# Patient Record
Sex: FEMALE | Race: WHITE | NOT HISPANIC OR LATINO | Employment: OTHER | URBAN - METROPOLITAN AREA
[De-identification: names, ages, dates, MRNs, and addresses within clinical notes are randomized per-mention and may not be internally consistent; named-entity substitution may affect disease eponyms.]

---

## 2017-01-27 ENCOUNTER — TELEPHONE (OUTPATIENT)
Dept: ONCOLOGY | Facility: HOSPITAL | Age: 77
End: 2017-01-27

## 2017-01-27 DIAGNOSIS — C50.919 MALIGNANT NEOPLASM OF FEMALE BREAST, UNSPECIFIED LATERALITY, UNSPECIFIED SITE OF BREAST: Primary | ICD-10-CM

## 2017-01-27 NOTE — TELEPHONE ENCOUNTER
----- Message from Kiki Bolden sent at 1/27/2017  4:23 PM EST -----  Contact: son   Return call isabel      [phone]

## 2017-01-27 NOTE — TELEPHONE ENCOUNTER
----- Message from Kiki Bolden sent at 1/27/2017  3:54 PM EST -----  Contact: son  Return call [phone]    Attempted to call Miguelito. No answer, left message to call back.

## 2017-01-27 NOTE — TELEPHONE ENCOUNTER
----- Message from Kiki Bolden sent at 1/27/2017  1:28 PM EST -----  Contact: son   Pt son is calling about his mother arm that is swollen     miguelito  [phone]    Attempted to call Miguelito back. No answer, left message to call back

## 2017-01-27 NOTE — TELEPHONE ENCOUNTER
Received a call from pt's son stating that pt had a pace maker replaced today and they noticed some left arm swelling. Pt had US on left arm done at [facility] that was negative. They put dye in that arm and since pacemaker was put in, they put her in a sling on her left arm and she cannot move it. Wondering what needs to be done. Pt has a sleeve at home but unable to put it on because of sling. S/W Heather Concepcion NP. Per Heather, have pt be seen at lymphedema clinic. Informed pt's son that we would be referring her there. Informed him that if she has any trouble over the weekend, there is always a MD on call. He v/u. Order placed and message sent to scheduling.

## 2017-03-14 ENCOUNTER — TELEPHONE (OUTPATIENT)
Dept: ONCOLOGY | Facility: HOSPITAL | Age: 77
End: 2017-03-14

## 2017-03-14 RX ORDER — ANASTROZOLE 1 MG/1
1 TABLET ORAL DAILY
Qty: 90 TABLET | Refills: 1 | Status: SHIPPED | OUTPATIENT
Start: 2017-03-14 | End: 2017-05-05 | Stop reason: SDUPTHER

## 2017-03-14 NOTE — TELEPHONE ENCOUNTER
----- Message from Kiki Bolden sent at 3/14/2017  2:23 PM EDT -----   Per patient  bandar hernandez is needing a new  refill on her armidex 1mg#30 for 90m days            [phone]              escribed to pharmacy

## 2017-05-02 ENCOUNTER — HOSPITAL ENCOUNTER (OUTPATIENT)
Dept: BONE DENSITY | Facility: HOSPITAL | Age: 77
Discharge: HOME | End: 2017-05-02
Attending: INTERNAL MEDICINE | Admitting: INTERNAL MEDICINE

## 2017-05-02 DIAGNOSIS — M81.0 OSTEOPOROSIS: ICD-10-CM

## 2017-05-02 PROCEDURE — 77080 DXA BONE DENSITY AXIAL: CPT

## 2017-05-05 ENCOUNTER — OFFICE VISIT (OUTPATIENT)
Dept: ONCOLOGY | Facility: CLINIC | Age: 77
End: 2017-05-05

## 2017-05-05 ENCOUNTER — INFUSION (OUTPATIENT)
Dept: ONCOLOGY | Facility: HOSPITAL | Age: 77
End: 2017-05-05

## 2017-05-05 VITALS
RESPIRATION RATE: 16 BRPM | OXYGEN SATURATION: 98 % | SYSTOLIC BLOOD PRESSURE: 126 MMHG | BODY MASS INDEX: 28.6 KG/M2 | HEIGHT: 63 IN | WEIGHT: 161.4 LBS | HEART RATE: 69 BPM | TEMPERATURE: 98 F | DIASTOLIC BLOOD PRESSURE: 86 MMHG

## 2017-05-05 DIAGNOSIS — Z85.3 HISTORY OF BREAST CANCER: ICD-10-CM

## 2017-05-05 DIAGNOSIS — C50.512 MALIGNANT NEOPLASM OF LOWER-OUTER QUADRANT OF LEFT FEMALE BREAST (HCC): Primary | ICD-10-CM

## 2017-05-05 DIAGNOSIS — Z12.31 ENCOUNTER FOR SCREENING MAMMOGRAM FOR MALIGNANT NEOPLASM OF BREAST: ICD-10-CM

## 2017-05-05 DIAGNOSIS — M81.0 OSTEOPOROSIS: ICD-10-CM

## 2017-05-05 DIAGNOSIS — Z85.3 HISTORY OF BREAST CANCER: Primary | ICD-10-CM

## 2017-05-05 LAB
25(OH)D3 SERPL-MCNC: 49.7 NG/ML (ref 30–100)
ALBUMIN SERPL-MCNC: 3.5 G/DL (ref 3.5–5.2)
ALBUMIN/GLOB SERPL: 1.2 G/DL (ref 1.1–2.4)
ALP SERPL-CCNC: 73 U/L (ref 38–116)
ALT SERPL W P-5'-P-CCNC: 12 U/L (ref 0–33)
ANION GAP SERPL CALCULATED.3IONS-SCNC: 11.4 MMOL/L
AST SERPL-CCNC: 22 U/L (ref 0–32)
BASOPHILS # BLD AUTO: 0.03 10*3/MM3 (ref 0–0.1)
BASOPHILS NFR BLD AUTO: 0.7 % (ref 0–1.1)
BILIRUB SERPL-MCNC: 0.3 MG/DL (ref 0.1–1.2)
BUN BLD-MCNC: 37 MG/DL (ref 6–20)
BUN/CREAT SERPL: 33 (ref 7.3–30)
CALCIUM SPEC-SCNC: 9.2 MG/DL (ref 8.5–10.2)
CHLORIDE SERPL-SCNC: 102 MMOL/L (ref 98–107)
CO2 SERPL-SCNC: 27.6 MMOL/L (ref 22–29)
CREAT BLD-MCNC: 1.12 MG/DL (ref 0.6–1.1)
DEPRECATED RDW RBC AUTO: 52.2 FL (ref 37–49)
EOSINOPHIL # BLD AUTO: 0.21 10*3/MM3 (ref 0–0.36)
EOSINOPHIL NFR BLD AUTO: 4.6 % (ref 1–5)
ERYTHROCYTE [DISTWIDTH] IN BLOOD BY AUTOMATED COUNT: 13.9 % (ref 11.7–14.5)
GFR SERPL CREATININE-BSD FRML MDRD: 47 ML/MIN/1.73
GLOBULIN UR ELPH-MCNC: 2.9 GM/DL (ref 1.8–3.5)
GLUCOSE BLD-MCNC: 86 MG/DL (ref 74–124)
HCT VFR BLD AUTO: 38.9 % (ref 34–45)
HGB BLD-MCNC: 12.4 G/DL (ref 11.5–14.9)
IMM GRANULOCYTES # BLD: 0.01 10*3/MM3 (ref 0–0.03)
IMM GRANULOCYTES NFR BLD: 0.2 % (ref 0–0.5)
LYMPHOCYTES # BLD AUTO: 1.01 10*3/MM3 (ref 1–3.5)
LYMPHOCYTES NFR BLD AUTO: 22.1 % (ref 20–49)
MAGNESIUM SERPL-MCNC: 1.8 MG/DL (ref 1.8–2.5)
MCH RBC QN AUTO: 32.5 PG (ref 27–33)
MCHC RBC AUTO-ENTMCNC: 31.9 G/DL (ref 32–35)
MCV RBC AUTO: 101.8 FL (ref 83–97)
MONOCYTES # BLD AUTO: 0.45 10*3/MM3 (ref 0.25–0.8)
MONOCYTES NFR BLD AUTO: 9.8 % (ref 4–12)
NEUTROPHILS # BLD AUTO: 2.86 10*3/MM3 (ref 1.5–7)
NEUTROPHILS NFR BLD AUTO: 62.6 % (ref 39–75)
NRBC BLD MANUAL-RTO: 0 /100 WBC (ref 0–0)
PHOSPHATE SERPL-MCNC: 3.2 MG/DL (ref 2.5–4.5)
PLATELET # BLD AUTO: 130 10*3/MM3 (ref 150–375)
PMV BLD AUTO: 9.5 FL (ref 8.9–12.1)
POTASSIUM BLD-SCNC: 4.2 MMOL/L (ref 3.5–4.7)
PROT SERPL-MCNC: 6.4 G/DL (ref 6.3–8)
RBC # BLD AUTO: 3.82 10*6/MM3 (ref 3.9–5)
SODIUM BLD-SCNC: 141 MMOL/L (ref 134–145)
WBC NRBC COR # BLD: 4.57 10*3/MM3 (ref 4–10)

## 2017-05-05 PROCEDURE — 84100 ASSAY OF PHOSPHORUS: CPT | Performed by: INTERNAL MEDICINE

## 2017-05-05 PROCEDURE — 83735 ASSAY OF MAGNESIUM: CPT | Performed by: INTERNAL MEDICINE

## 2017-05-05 PROCEDURE — 99213 OFFICE O/P EST LOW 20 MIN: CPT | Performed by: INTERNAL MEDICINE

## 2017-05-05 PROCEDURE — 96365 THER/PROPH/DIAG IV INF INIT: CPT | Performed by: INTERNAL MEDICINE

## 2017-05-05 PROCEDURE — 85025 COMPLETE CBC W/AUTO DIFF WBC: CPT

## 2017-05-05 PROCEDURE — 25010000002 ZOLEDRONIC ACID 5 MG/100ML SOLUTION: Performed by: INTERNAL MEDICINE

## 2017-05-05 PROCEDURE — 80053 COMPREHEN METABOLIC PANEL: CPT

## 2017-05-05 PROCEDURE — 82306 VITAMIN D 25 HYDROXY: CPT | Performed by: INTERNAL MEDICINE

## 2017-05-05 RX ORDER — ZOLEDRONIC ACID 5 MG/100ML
5 INJECTION, SOLUTION INTRAVENOUS ONCE
Status: CANCELLED | OUTPATIENT
Start: 2017-05-05

## 2017-05-05 RX ORDER — SODIUM CHLORIDE 9 MG/ML
250 INJECTION, SOLUTION INTRAVENOUS ONCE
Status: CANCELLED | OUTPATIENT
Start: 2017-05-05

## 2017-05-05 RX ORDER — ZOLEDRONIC ACID 5 MG/100ML
5 INJECTION, SOLUTION INTRAVENOUS ONCE
Status: CANCELLED | OUTPATIENT
Start: 2018-05-05

## 2017-05-05 RX ORDER — ZOLEDRONIC ACID 5 MG/100ML
5 INJECTION, SOLUTION INTRAVENOUS ONCE
Status: COMPLETED | OUTPATIENT
Start: 2017-05-05 | End: 2017-05-05

## 2017-05-05 RX ORDER — RIVAROXABAN 20 MG/1
TABLET, FILM COATED ORAL
COMMUNITY
Start: 2017-04-14 | End: 2017-07-18

## 2017-05-05 RX ORDER — ANASTROZOLE 1 MG/1
1 TABLET ORAL DAILY
Qty: 90 TABLET | Refills: 3 | Status: SHIPPED | OUTPATIENT
Start: 2017-05-05 | End: 2018-06-22 | Stop reason: SDUPTHER

## 2017-05-05 RX ORDER — TIOTROPIUM BROMIDE 18 UG/1
CAPSULE ORAL; RESPIRATORY (INHALATION)
COMMUNITY
Start: 2017-04-10

## 2017-05-05 RX ORDER — SODIUM CHLORIDE 9 MG/ML
250 INJECTION, SOLUTION INTRAVENOUS ONCE
Status: COMPLETED | OUTPATIENT
Start: 2017-05-05 | End: 2017-05-05

## 2017-05-05 RX ORDER — SODIUM CHLORIDE 9 MG/ML
250 INJECTION, SOLUTION INTRAVENOUS ONCE
Status: CANCELLED | OUTPATIENT
Start: 2018-05-05

## 2017-05-05 RX ADMIN — SODIUM CHLORIDE 250 ML: 900 INJECTION, SOLUTION INTRAVENOUS at 15:54

## 2017-05-05 RX ADMIN — ZOLEDRONIC ACID 5 MG: 0.05 INJECTION, SOLUTION INTRAVENOUS at 15:53

## 2017-07-18 ENCOUNTER — OFFICE VISIT (OUTPATIENT)
Dept: ENDOCRINOLOGY | Age: 77
End: 2017-07-18

## 2017-07-18 VITALS
SYSTOLIC BLOOD PRESSURE: 128 MMHG | WEIGHT: 158 LBS | BODY MASS INDEX: 29.08 KG/M2 | HEIGHT: 62 IN | OXYGEN SATURATION: 97 % | DIASTOLIC BLOOD PRESSURE: 88 MMHG | HEART RATE: 62 BPM

## 2017-07-18 DIAGNOSIS — R13.19 OTHER DYSPHAGIA: ICD-10-CM

## 2017-07-18 DIAGNOSIS — E04.2 GOITER, NONTOXIC, MULTINODULAR: ICD-10-CM

## 2017-07-18 DIAGNOSIS — E04.1 THYROID NODULE: Primary | ICD-10-CM

## 2017-07-18 DIAGNOSIS — E55.9 VITAMIN D DEFICIENCY: ICD-10-CM

## 2017-07-18 DIAGNOSIS — M81.0 OSTEOPOROSIS: ICD-10-CM

## 2017-07-18 PROCEDURE — 99214 OFFICE O/P EST MOD 30 MIN: CPT | Performed by: INTERNAL MEDICINE

## 2017-07-18 RX ORDER — PANTOPRAZOLE SODIUM 40 MG/1
TABLET, DELAYED RELEASE ORAL
COMMUNITY
Start: 2017-06-20 | End: 2017-11-13

## 2017-08-24 ENCOUNTER — HOSPITAL ENCOUNTER (OUTPATIENT)
Dept: MAMMOGRAPHY | Facility: HOSPITAL | Age: 77
Discharge: HOME | End: 2017-08-24
Attending: INTERNAL MEDICINE | Admitting: INTERNAL MEDICINE

## 2017-08-24 DIAGNOSIS — C50.512 MALIGNANT NEOPLASM OF LOWER-OUTER QUADRANT OF LEFT FEMALE BREAST (HCC): ICD-10-CM

## 2017-08-24 DIAGNOSIS — Z12.31 ENCOUNTER FOR SCREENING MAMMOGRAM FOR MALIGNANT NEOPLASM OF BREAST: ICD-10-CM

## 2017-08-24 PROCEDURE — G0202 SCR MAMMO BI INCL CAD: HCPCS

## 2017-08-30 ENCOUNTER — TELEPHONE (OUTPATIENT)
Dept: ONCOLOGY | Facility: CLINIC | Age: 77
End: 2017-08-30

## 2017-08-30 DIAGNOSIS — R92.8 ABNORMAL MAMMOGRAM OF RIGHT BREAST: Primary | ICD-10-CM

## 2017-08-30 NOTE — TELEPHONE ENCOUNTER
Received results of her right mammogram showing an abnormality, with diagnostic imaging recommended.    I called and notified her of this and have ordered a diagnostic mammogram with ultrasound of the right breast.

## 2017-08-31 ENCOUNTER — TELEPHONE (OUTPATIENT)
Dept: GENERAL RADIOLOGY | Facility: HOSPITAL | Age: 77
End: 2017-08-31

## 2017-09-01 ENCOUNTER — APPOINTMENT (OUTPATIENT)
Dept: ULTRASOUND IMAGING | Facility: HOSPITAL | Age: 77
End: 2017-09-01
Attending: INTERNAL MEDICINE

## 2017-09-01 ENCOUNTER — APPOINTMENT (OUTPATIENT)
Dept: MAMMOGRAPHY | Facility: HOSPITAL | Age: 77
End: 2017-09-01
Attending: INTERNAL MEDICINE

## 2017-09-07 ENCOUNTER — HOSPITAL ENCOUNTER (OUTPATIENT)
Dept: ULTRASOUND IMAGING | Facility: HOSPITAL | Age: 77
Discharge: HOME | End: 2017-09-07
Attending: INTERNAL MEDICINE

## 2017-09-07 ENCOUNTER — HOSPITAL ENCOUNTER (OUTPATIENT)
Dept: MAMMOGRAPHY | Facility: HOSPITAL | Age: 77
Discharge: HOME | End: 2017-09-07
Attending: INTERNAL MEDICINE | Admitting: INTERNAL MEDICINE

## 2017-09-07 DIAGNOSIS — R92.8 ABNORMAL MAMMOGRAM OF RIGHT BREAST: ICD-10-CM

## 2017-09-07 PROCEDURE — 76642 ULTRASOUND BREAST LIMITED: CPT

## 2017-09-07 PROCEDURE — G0206 DX MAMMO INCL CAD UNI: HCPCS

## 2017-09-13 ENCOUNTER — TELEPHONE (OUTPATIENT)
Dept: ONCOLOGY | Facility: CLINIC | Age: 77
End: 2017-09-13

## 2017-09-13 NOTE — TELEPHONE ENCOUNTER
----- Message from Dipesh Monterroso MD sent at 9/13/2017  7:35 AM EDT -----  Regarding: please call w mammo and u/s results  Please let her know that her mammogram and u/s show probable scar tissue and other benign findings and the radiologist wants them repeated in 6 months.  Thanks,  AURELIO

## 2017-09-13 NOTE — TELEPHONE ENCOUNTER
Informed pt. Per dr. Monterroso that her mammogram and u/s showed some scar tissue and some other benign findings.  He recommends to repeat them again in 6 months. V/u.

## 2017-11-13 ENCOUNTER — LAB (OUTPATIENT)
Dept: LAB | Facility: HOSPITAL | Age: 77
End: 2017-11-13
Attending: INTERNAL MEDICINE

## 2017-11-13 ENCOUNTER — OFFICE VISIT (OUTPATIENT)
Dept: ONCOLOGY | Facility: CLINIC | Age: 77
End: 2017-11-13
Attending: INTERNAL MEDICINE

## 2017-11-13 VITALS
WEIGHT: 155.4 LBS | BODY MASS INDEX: 27.54 KG/M2 | HEIGHT: 63 IN | RESPIRATION RATE: 16 BRPM | TEMPERATURE: 97.9 F | SYSTOLIC BLOOD PRESSURE: 122 MMHG | HEART RATE: 73 BPM | DIASTOLIC BLOOD PRESSURE: 76 MMHG | OXYGEN SATURATION: 96 %

## 2017-11-13 DIAGNOSIS — Z17.0 MALIGNANT NEOPLASM OF LOWER-OUTER QUADRANT OF LEFT BREAST OF FEMALE, ESTROGEN RECEPTOR POSITIVE (HCC): Primary | ICD-10-CM

## 2017-11-13 DIAGNOSIS — C50.512 MALIGNANT NEOPLASM OF LOWER-OUTER QUADRANT OF LEFT FEMALE BREAST (HCC): ICD-10-CM

## 2017-11-13 DIAGNOSIS — R92.8 ABNORMALITY OF RIGHT BREAST ON SCREENING MAMMOGRAM: ICD-10-CM

## 2017-11-13 DIAGNOSIS — C50.512 MALIGNANT NEOPLASM OF LOWER-OUTER QUADRANT OF LEFT BREAST OF FEMALE, ESTROGEN RECEPTOR POSITIVE (HCC): Primary | ICD-10-CM

## 2017-11-13 LAB
BASOPHILS # BLD AUTO: 0.05 10*3/MM3 (ref 0–0.1)
BASOPHILS NFR BLD AUTO: 1 % (ref 0–1.1)
DEPRECATED RDW RBC AUTO: 50.7 FL (ref 37–49)
EOSINOPHIL # BLD AUTO: 0.27 10*3/MM3 (ref 0–0.36)
EOSINOPHIL NFR BLD AUTO: 5.2 % (ref 1–5)
ERYTHROCYTE [DISTWIDTH] IN BLOOD BY AUTOMATED COUNT: 14.5 % (ref 11.7–14.5)
HCT VFR BLD AUTO: 42.9 % (ref 34–45)
HGB BLD-MCNC: 14.5 G/DL (ref 11.5–14.9)
IMM GRANULOCYTES # BLD: 0.03 10*3/MM3 (ref 0–0.03)
IMM GRANULOCYTES NFR BLD: 0.6 % (ref 0–0.5)
LYMPHOCYTES # BLD AUTO: 1.26 10*3/MM3 (ref 1–3.5)
LYMPHOCYTES NFR BLD AUTO: 24.1 % (ref 20–49)
MCH RBC QN AUTO: 32.4 PG (ref 27–33)
MCHC RBC AUTO-ENTMCNC: 33.8 G/DL (ref 32–35)
MCV RBC AUTO: 96 FL (ref 83–97)
MONOCYTES # BLD AUTO: 0.67 10*3/MM3 (ref 0.25–0.8)
MONOCYTES NFR BLD AUTO: 12.8 % (ref 4–12)
NEUTROPHILS # BLD AUTO: 2.94 10*3/MM3 (ref 1.5–7)
NEUTROPHILS NFR BLD AUTO: 56.3 % (ref 39–75)
NRBC BLD MANUAL-RTO: 0 /100 WBC (ref 0–0)
PLATELET # BLD AUTO: 127 10*3/MM3 (ref 150–375)
PMV BLD AUTO: 10.5 FL (ref 8.9–12.1)
RBC # BLD AUTO: 4.47 10*6/MM3 (ref 3.9–5)
WBC NRBC COR # BLD: 5.22 10*3/MM3 (ref 4–10)

## 2017-11-13 PROCEDURE — 36416 COLLJ CAPILLARY BLOOD SPEC: CPT | Performed by: INTERNAL MEDICINE

## 2017-11-13 PROCEDURE — 99213 OFFICE O/P EST LOW 20 MIN: CPT | Performed by: INTERNAL MEDICINE

## 2017-11-13 PROCEDURE — 85025 COMPLETE CBC W/AUTO DIFF WBC: CPT | Performed by: INTERNAL MEDICINE

## 2017-11-13 RX ORDER — VENLAFAXINE HYDROCHLORIDE 75 MG/1
CAPSULE, EXTENDED RELEASE ORAL
COMMUNITY
Start: 2017-10-02

## 2017-11-13 RX ORDER — SIMVASTATIN 20 MG
TABLET ORAL
COMMUNITY
Start: 2017-10-11 | End: 2018-05-14

## 2017-11-13 NOTE — PROGRESS NOTES
[facility] GROUP OUTPATIENT CLINIC FOLLOW UP VISIT    REASON FOR FOLLOW-UP:    1.  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  She had a left mastectomy and subsequently completed adjuvant chemotherapy with Adriamycin and Cytoxan followed by Taxol, with chemotherapy initiated on 10/5/2012.  Chemotherapy was complete as of 3/14/2013.  2.  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  3.  Anastrozole 1 mg daily was initiated in April 2013.  4.  Osteoporosis: Reclast was initiated in April 2013 with plans for annual therapy.  Bone density on 5/2/2017 shows osteoporosis with stable findings.    HISTORY OF PRESENT ILLNESS:  Michelle Peña is a 77 y.o. female who returns today for follow up of the above issue.      She continues to tolerate the therapy reasonably well.  She continues to have severe osteoarthritis pain mostly in her knees.  In May of this year after I saw her apparently she was hospitalized [place] with apparent gastrointestinal bleeding.  She states that she had upper and lower endoscopy with no obvious etiology of bleeding discovered.  We have no records.  She states that she was on meloxicam at the time and this was discontinued.  She was also on Xarelto at that time due to atrial fibrillation.  She has been reluctant to resume this due to the risk of recurrent bleeding.    In addition, she did have an abnormal screening mammogram of the right breast.  This was followed up with diagnostic imaging performed in early September which was read as BI-RADS Category 3 with follow-up in 6 months recommended.    PAST MEDICAL, SURGICAL, FAMILY, AND SOCIAL HISTORIES WERE REVIEWED WITH THE PATIENT AND IN THE ELECTRONIC MEDICAL RECORD, AND WERE UPDATED IF INDICATED.    ALLERGIES:  Allergies   Allergen Reactions   • Augmentin [Amoxicillin-Pot Clavulanate]    • Penicillins        MEDICATIONS:  The medication  "list has been reviewed with the patient by the medical assistant, and the list has been updated in the electronic medical record, which I reviewed.  Medication dosages and frequencies were confirmed to be accurate.    REVIEW OF SYSTEMS:  PAIN:  See Vital Signs below.  GENERAL:  No fevers, chills, night sweats, or unintended weight loss.  Mild fatigue.  SKIN:  No rashes or non-healing lesions.  HEME/LYMPH:  No abnormal bleeding.  No palpable lymphadenopathy.  EYES:  No vision changes or diplopia.  ENT:  No sore throat or difficulty swallowing.  RESPIRATORY:  No cough, shortness of breath, hemoptysis, or wheezing.  CARDIOVASCULAR:  No chest pain, palpitations, orthopnea, or dyspnea on exertion.  GASTROINTESTINAL:  No abdominal pain, nausea, vomiting, constipation, diarrhea, melena, or hematochezia.  GENITOURINARY:  No dysuria or hematuria.  MUSCULOSKELETAL:  Persistent osteoarthritis pain in her knees.  NEUROLOGIC:  No dizziness, loss of consciousness, or seizures.  PSYCHIATRIC:  No depression, anxiety, or mood changes.    Vitals:    11/13/17 1110   BP: 122/76   Pulse: 73   Resp: 16   Temp: 97.9 °F (36.6 °C)   TempSrc: Oral   SpO2: 96%   Weight: 155 lb 6.4 oz (70.5 kg)   Height: 62.52\" (158.8 cm)  Comment: new ht w/shoes   PainSc: 9  Comment: knees       PHYSICAL EXAMINATION:  GENERAL:  Well-developed well-nourished female; awake, alert and oriented, in no acute distress.  SKIN:  Warm and dry, without rashes, purpura, or petechiae.  HEAD:  Normocephalic, atraumatic.  EYES:  Pupils equal, round and reactive to light.  Extraocular movements intact.  Conjunctivae normal.  EARS:  Hearing intact.  NOSE:  Septum midline.  No excoriations or nasal discharge.  MOUTH:  No stomatitis or ulcers.  Lips are normal.  THROAT:  Oropharynx without lesions or exudates.  NECK:  Supple with good range of motion; no thyromegaly or masses; no JVD or bruits.  LYMPHATICS:  No cervical, supraclavicular, axillary, or inguinal " lymphadenopathy.  CHEST:  Lungs are clear to auscultation bilaterally.  No wheezes, rales, or rhonchi.  An ICD is present in the left subclavian area.  BREASTS:  The right breast was examined today.  No nodules or nipple discharge.  No right axillary adenopathy.  The left chest wall is examined today.  No skin changes or subcutaneous nodules.  HEART:  Regular rate; normal rhythm.  No murmurs, gallops or rubs.  ABDOMEN:  Soft, non-tender, non-distended.  Normal active bowel sounds.  No organomegaly.  EXTREMITIES:  No clubbing, cyanosis, or edema.  Osteoarthritic changes present in her knees.  NEUROLOGICAL:  No focal neurologic deficits.    DIAGNOSTIC DATA:  Lab Results   Component Value Date    WBC 5.22 11/13/2017    HGB 14.5 11/13/2017    HCT 42.9 11/13/2017    MCV 96.0 11/13/2017     (L) 11/13/2017       IMAGING:      Screening mammogram 8/24/2017 reds category 0.  Follow-up diagnostic imaging 9/7/2017 BI-RADS Category 3 follow-up in 6 months recommended.    DEXA scan on 5/2/2017 shows osteoporosis with stable findings.      ASSESSMENT:  This is a 77 y.o. female with:  1.  History of pathologic stage IIIc invasive mammary carcinoma of the lower outer left breast.  She had a left mastectomy and subsequently completed adjuvant chemotherapy with Adriamycin and Cytoxan followed by Taxol, with chemotherapy initiated on 10/5/2012.  Chemotherapy was complete as of 3/14/2013.  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  Anastrozole 1 mg daily was initiated in April 2013 with plans for 5 years of therapy to be complete as of April 2018.  At this point considering the extent of disease at diagnosis, we are considering continuation of anastrozole beyond 5 years.  2.  Osteoporosis: Reclast was initiated in April 2013 with plans for annual therapy.   DEXA scan on 5/2/2017 with no significant change from prior.  She will be due for Reclast in early  May.    PLAN:   1.  Continue anastrozole 1 mg daily.  We may elect to continue this beyond 5 years.  2.  Reclast annually.  This will be administered in 6 months at her next follow-up visit.  3.  Return in 6 months for follow-up with a CBC and breast exam and Reclast that day with repeat diagnostic imaging of the right breast done about a week prior to that visit.

## 2018-05-07 ENCOUNTER — HOSPITAL ENCOUNTER (OUTPATIENT)
Dept: ULTRASOUND IMAGING | Facility: HOSPITAL | Age: 78
End: 2018-05-07
Attending: INTERNAL MEDICINE

## 2018-05-07 ENCOUNTER — HOSPITAL ENCOUNTER (OUTPATIENT)
Dept: MAMMOGRAPHY | Facility: HOSPITAL | Age: 78
Discharge: HOME | End: 2018-05-07
Attending: INTERNAL MEDICINE | Admitting: INTERNAL MEDICINE

## 2018-05-07 DIAGNOSIS — R92.8 ABNORMALITY OF RIGHT BREAST ON SCREENING MAMMOGRAM: ICD-10-CM

## 2018-05-07 PROCEDURE — 77065 DX MAMMO INCL CAD UNI: CPT

## 2018-05-08 DIAGNOSIS — Z17.0 MALIGNANT NEOPLASM OF LOWER-OUTER QUADRANT OF LEFT BREAST OF FEMALE, ESTROGEN RECEPTOR POSITIVE (HCC): Primary | ICD-10-CM

## 2018-05-08 DIAGNOSIS — C50.512 MALIGNANT NEOPLASM OF LOWER-OUTER QUADRANT OF LEFT BREAST OF FEMALE, ESTROGEN RECEPTOR POSITIVE (HCC): Primary | ICD-10-CM

## 2018-05-10 ENCOUNTER — TELEPHONE (OUTPATIENT)
Dept: ONCOLOGY | Facility: CLINIC | Age: 78
End: 2018-05-10

## 2018-05-10 DIAGNOSIS — Z17.0 MALIGNANT NEOPLASM OF LOWER-OUTER QUADRANT OF LEFT BREAST OF FEMALE, ESTROGEN RECEPTOR POSITIVE (HCC): Primary | ICD-10-CM

## 2018-05-10 DIAGNOSIS — M81.0 OSTEOPOROSIS WITHOUT CURRENT PATHOLOGICAL FRACTURE, UNSPECIFIED OSTEOPOROSIS TYPE: ICD-10-CM

## 2018-05-10 DIAGNOSIS — C50.512 MALIGNANT NEOPLASM OF LOWER-OUTER QUADRANT OF LEFT BREAST OF FEMALE, ESTROGEN RECEPTOR POSITIVE (HCC): Primary | ICD-10-CM

## 2018-05-10 RX ORDER — SODIUM CHLORIDE 9 MG/ML
250 INJECTION, SOLUTION INTRAVENOUS ONCE
Status: CANCELLED | OUTPATIENT
Start: 2018-05-14

## 2018-05-10 RX ORDER — ZOLEDRONIC ACID 5 MG/100ML
5 INJECTION, SOLUTION INTRAVENOUS ONCE
Status: CANCELLED | OUTPATIENT
Start: 2018-05-14

## 2018-05-14 ENCOUNTER — OFFICE VISIT (OUTPATIENT)
Dept: ONCOLOGY | Facility: CLINIC | Age: 78
End: 2018-05-14

## 2018-05-14 ENCOUNTER — INFUSION (OUTPATIENT)
Dept: ONCOLOGY | Facility: HOSPITAL | Age: 78
End: 2018-05-14

## 2018-05-14 VITALS
DIASTOLIC BLOOD PRESSURE: 80 MMHG | HEART RATE: 66 BPM | RESPIRATION RATE: 14 BRPM | SYSTOLIC BLOOD PRESSURE: 120 MMHG | WEIGHT: 157 LBS | HEIGHT: 62 IN | OXYGEN SATURATION: 97 % | TEMPERATURE: 98.2 F | BODY MASS INDEX: 28.89 KG/M2

## 2018-05-14 DIAGNOSIS — C50.512 MALIGNANT NEOPLASM OF LOWER-OUTER QUADRANT OF LEFT BREAST OF FEMALE, ESTROGEN RECEPTOR POSITIVE (HCC): ICD-10-CM

## 2018-05-14 DIAGNOSIS — Z17.0 MALIGNANT NEOPLASM OF LOWER-OUTER QUADRANT OF LEFT BREAST OF FEMALE, ESTROGEN RECEPTOR POSITIVE (HCC): ICD-10-CM

## 2018-05-14 DIAGNOSIS — C50.512 MALIGNANT NEOPLASM OF LOWER-OUTER QUADRANT OF LEFT BREAST OF FEMALE, ESTROGEN RECEPTOR POSITIVE (HCC): Primary | ICD-10-CM

## 2018-05-14 DIAGNOSIS — M81.0 OSTEOPOROSIS WITHOUT CURRENT PATHOLOGICAL FRACTURE, UNSPECIFIED OSTEOPOROSIS TYPE: Primary | ICD-10-CM

## 2018-05-14 DIAGNOSIS — Z85.3 HISTORY OF BREAST CANCER: ICD-10-CM

## 2018-05-14 DIAGNOSIS — M81.0 OSTEOPOROSIS WITHOUT CURRENT PATHOLOGICAL FRACTURE, UNSPECIFIED OSTEOPOROSIS TYPE: ICD-10-CM

## 2018-05-14 DIAGNOSIS — Z17.0 MALIGNANT NEOPLASM OF LOWER-OUTER QUADRANT OF LEFT BREAST OF FEMALE, ESTROGEN RECEPTOR POSITIVE (HCC): Primary | ICD-10-CM

## 2018-05-14 LAB
ALBUMIN SERPL-MCNC: 4.1 G/DL (ref 3.5–5.2)
ALBUMIN/GLOB SERPL: 1.5 G/DL (ref 1.1–2.4)
ALP SERPL-CCNC: 84 U/L (ref 38–116)
ALT SERPL W P-5'-P-CCNC: 13 U/L (ref 0–33)
ANION GAP SERPL CALCULATED.3IONS-SCNC: 11.5 MMOL/L
AST SERPL-CCNC: 24 U/L (ref 0–32)
BASOPHILS # BLD AUTO: 0.04 10*3/MM3 (ref 0–0.1)
BASOPHILS NFR BLD AUTO: 0.7 % (ref 0–1.1)
BILIRUB SERPL-MCNC: 0.7 MG/DL (ref 0.1–1.2)
BUN BLD-MCNC: 32 MG/DL (ref 6–20)
BUN/CREAT SERPL: 24.4 (ref 7.3–30)
CALCIUM SPEC-SCNC: 10.2 MG/DL (ref 8.5–10.2)
CHLORIDE SERPL-SCNC: 99 MMOL/L (ref 98–107)
CO2 SERPL-SCNC: 28.5 MMOL/L (ref 22–29)
CREAT BLD-MCNC: 1.31 MG/DL (ref 0.6–1.1)
DEPRECATED RDW RBC AUTO: 47.8 FL (ref 37–49)
EOSINOPHIL # BLD AUTO: 0.11 10*3/MM3 (ref 0–0.36)
EOSINOPHIL NFR BLD AUTO: 2 % (ref 1–5)
ERYTHROCYTE [DISTWIDTH] IN BLOOD BY AUTOMATED COUNT: 13.1 % (ref 11.7–14.5)
GFR SERPL CREATININE-BSD FRML MDRD: 39 ML/MIN/1.73
GLOBULIN UR ELPH-MCNC: 2.7 GM/DL (ref 1.8–3.5)
GLUCOSE BLD-MCNC: 148 MG/DL (ref 74–124)
HCT VFR BLD AUTO: 45.6 % (ref 34–45)
HGB BLD-MCNC: 14.9 G/DL (ref 11.5–14.9)
IMM GRANULOCYTES # BLD: 0.01 10*3/MM3 (ref 0–0.03)
IMM GRANULOCYTES NFR BLD: 0.2 % (ref 0–0.5)
LYMPHOCYTES # BLD AUTO: 1.02 10*3/MM3 (ref 1–3.5)
LYMPHOCYTES NFR BLD AUTO: 19 % (ref 20–49)
MAGNESIUM SERPL-MCNC: 1.7 MG/DL (ref 1.8–2.5)
MCH RBC QN AUTO: 32.3 PG (ref 27–33)
MCHC RBC AUTO-ENTMCNC: 32.7 G/DL (ref 32–35)
MCV RBC AUTO: 98.7 FL (ref 83–97)
MONOCYTES # BLD AUTO: 0.43 10*3/MM3 (ref 0.25–0.8)
MONOCYTES NFR BLD AUTO: 8 % (ref 4–12)
NEUTROPHILS # BLD AUTO: 3.77 10*3/MM3 (ref 1.5–7)
NEUTROPHILS NFR BLD AUTO: 70.1 % (ref 39–75)
NRBC BLD MANUAL-RTO: 0 /100 WBC (ref 0–0)
PHOSPHATE SERPL-MCNC: 4.5 MG/DL (ref 2.5–4.5)
PLATELET # BLD AUTO: 137 10*3/MM3 (ref 150–375)
PMV BLD AUTO: 10.4 FL (ref 8.9–12.1)
POTASSIUM BLD-SCNC: 4.4 MMOL/L (ref 3.5–4.7)
PROT SERPL-MCNC: 6.8 G/DL (ref 6.3–8)
RBC # BLD AUTO: 4.62 10*6/MM3 (ref 3.9–5)
SODIUM BLD-SCNC: 139 MMOL/L (ref 134–145)
WBC NRBC COR # BLD: 5.38 10*3/MM3 (ref 4–10)

## 2018-05-14 PROCEDURE — 80053 COMPREHEN METABOLIC PANEL: CPT | Performed by: INTERNAL MEDICINE

## 2018-05-14 PROCEDURE — 96365 THER/PROPH/DIAG IV INF INIT: CPT | Performed by: INTERNAL MEDICINE

## 2018-05-14 PROCEDURE — 36415 COLL VENOUS BLD VENIPUNCTURE: CPT | Performed by: INTERNAL MEDICINE

## 2018-05-14 PROCEDURE — 25010000002 ZOLEDRONIC ACID 5 MG/100ML SOLUTION: Performed by: INTERNAL MEDICINE

## 2018-05-14 PROCEDURE — 84100 ASSAY OF PHOSPHORUS: CPT | Performed by: INTERNAL MEDICINE

## 2018-05-14 PROCEDURE — 83735 ASSAY OF MAGNESIUM: CPT | Performed by: INTERNAL MEDICINE

## 2018-05-14 PROCEDURE — 85025 COMPLETE CBC W/AUTO DIFF WBC: CPT | Performed by: INTERNAL MEDICINE

## 2018-05-14 PROCEDURE — 99214 OFFICE O/P EST MOD 30 MIN: CPT | Performed by: INTERNAL MEDICINE

## 2018-05-14 RX ORDER — SODIUM CHLORIDE 9 MG/ML
250 INJECTION, SOLUTION INTRAVENOUS ONCE
Status: CANCELLED | OUTPATIENT
Start: 2018-05-14

## 2018-05-14 RX ORDER — SODIUM CHLORIDE 9 MG/ML
250 INJECTION, SOLUTION INTRAVENOUS ONCE
Status: COMPLETED | OUTPATIENT
Start: 2018-05-14 | End: 2018-05-14

## 2018-05-14 RX ORDER — ZOLEDRONIC ACID 5 MG/100ML
5 INJECTION, SOLUTION INTRAVENOUS ONCE
Status: COMPLETED | OUTPATIENT
Start: 2018-05-14 | End: 2018-05-14

## 2018-05-14 RX ORDER — APIXABAN 5 MG/1
TABLET, FILM COATED ORAL
COMMUNITY
Start: 2018-04-25

## 2018-05-14 RX ORDER — ATORVASTATIN CALCIUM 20 MG/1
TABLET, FILM COATED ORAL
COMMUNITY
Start: 2018-02-21

## 2018-05-14 RX ORDER — ZOLEDRONIC ACID 5 MG/100ML
5 INJECTION, SOLUTION INTRAVENOUS ONCE
Status: CANCELLED | OUTPATIENT
Start: 2018-05-14

## 2018-05-14 RX ADMIN — SODIUM CHLORIDE 250 ML: 9 INJECTION, SOLUTION INTRAVENOUS at 14:57

## 2018-05-14 RX ADMIN — ZOLEDRONIC ACID 5 MG: 0.05 INJECTION, SOLUTION INTRAVENOUS at 15:12

## 2018-05-14 NOTE — PROGRESS NOTES
[facility] GROUP OUTPATIENT CLINIC FOLLOW UP VISIT    REASON FOR FOLLOW-UP:    1.  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  She had a left mastectomy and subsequently completed adjuvant chemotherapy with Adriamycin and Cytoxan followed by Taxol, with chemotherapy initiated on 10/5/2012.  Chemotherapy was complete as of 3/14/2013.  2.  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  3.  Anastrozole 1 mg daily was initiated in April 2013.  4.  Osteoporosis: Reclast was initiated in April 2013 with plans for annual therapy.  Bone density on 5/2/2017 shows osteoporosis with stable findings.    HISTORY OF PRESENT ILLNESS:  Michelle Peña is a 77 y.o. female who returns today for follow up of the above issue.      She unfortunately lost her  several months ago.  A few weeks after that she had a stroke.  She got TPA and has no residual neurologic effects from this.  Osteoarthritis pain in her knees is more severe.  She is wearing braces on both knees which do help.  She otherwise tolerates the anastrozole well without significant adverse effects.  She denies any new problems with her chest wall.  She recently had a repeat diagnostic mammogram that was read as BI-RADS Category 2 with routine annual follow-up recommended.    PAST MEDICAL, SURGICAL, FAMILY, AND SOCIAL HISTORIES WERE REVIEWED WITH THE PATIENT AND IN THE ELECTRONIC MEDICAL RECORD, AND WERE UPDATED IF INDICATED.    ALLERGIES:  Allergies   Allergen Reactions   • Augmentin [Amoxicillin-Pot Clavulanate]    • Penicillins        MEDICATIONS:  The medication list has been reviewed with the patient by the medical assistant, and the list has been updated in the electronic medical record, which I reviewed.  Medication dosages and frequencies were confirmed to be accurate.    REVIEW OF SYSTEMS:  PAIN:  See Vital Signs below.  GENERAL:  No fevers,  "chills, night sweats, or unintended weight loss.  Mild fatigue.  SKIN:  No rashes or non-healing lesions.  HEME/LYMPH:  No abnormal bleeding.  No palpable lymphadenopathy.  EYES:  No vision changes or diplopia.  ENT:  No sore throat or difficulty swallowing.  RESPIRATORY:  No cough, shortness of breath, hemoptysis, or wheezing.  CARDIOVASCULAR:  No chest pain, palpitations, orthopnea, or dyspnea on exertion.  GASTROINTESTINAL:  No abdominal pain, nausea, vomiting, constipation, diarrhea, melena, or hematochezia.  GENITOURINARY:  No dysuria or hematuria.  MUSCULOSKELETAL:  Persistent osteoarthritis pain in her knees which has been more severe recently.  NEUROLOGIC:  No dizziness, loss of consciousness, or seizures.  No neurologic effects following her stroke.  PSYCHIATRIC:  No depression, anxiety, or mood changes.    Vitals:    05/14/18 1354   BP: 120/80   Pulse: 66   Resp: 14   Temp: 98.2 °F (36.8 °C)   SpO2: 97%  Comment: at rest   Weight: 71.2 kg (157 lb)  Comment: wearing knee braces   Height: 157.5 cm (62\")  Comment: new ht w/shoes   PainSc:   9   PainLoc: Knee       PHYSICAL EXAMINATION:  GENERAL:  Well-developed well-nourished female; awake, alert and oriented, in no acute distress.  SKIN:  Warm and dry, without rashes, purpura, or petechiae.  HEAD:  Normocephalic, atraumatic.  EYES:  Pupils equal, round and reactive to light.  Extraocular movements intact.  Conjunctivae normal.  EARS:  Hearing intact.  NOSE:  Septum midline.  No excoriations or nasal discharge.  MOUTH:  No stomatitis or ulcers.  Lips are normal.  THROAT:  Oropharynx without lesions or exudates.  NECK:  Supple with good range of motion; no thyromegaly or masses; no JVD or bruits.  LYMPHATICS:  No cervical, supraclavicular, axillary, or inguinal lymphadenopathy.  CHEST:  Lungs are clear to auscultation bilaterally.  No wheezes, rales, or rhonchi.  An ICD is present in the left subclavian area.  BREASTS:  Not examined today  HEART:  Regular " rate; normal rhythm.  No murmurs, gallops or rubs.  ABDOMEN:  Soft, non-tender, non-distended.  Normal active bowel sounds.  No organomegaly.  EXTREMITIES:  No clubbing, cyanosis, or edema.  Osteoarthritic changes present in her knees.  NEUROLOGICAL:  No focal neurologic deficits.    DIAGNOSTIC DATA:  Results for orders placed or performed in visit on 05/14/18   Comprehensive Metabolic Panel   Result Value Ref Range    Glucose 148 (H) 74 - 124 mg/dL    BUN 32 (H) 6 - 20 mg/dL    Creatinine 1.31 (H) 0.60 - 1.10 mg/dL    Sodium 139 134 - 145 mmol/L    Potassium 4.4 3.5 - 4.7 mmol/L    Chloride 99 98 - 107 mmol/L    CO2 28.5 22.0 - 29.0 mmol/L    Calcium 10.2 8.5 - 10.2 mg/dL    Total Protein 6.8 6.3 - 8.0 g/dL    Albumin 4.10 3.50 - 5.20 g/dL    ALT (SGPT) 13 0 - 33 U/L    AST (SGOT) 24 0 - 32 U/L    Alkaline Phosphatase 84 38 - 116 U/L    Total Bilirubin 0.7 0.1 - 1.2 mg/dL    eGFR Non African Amer 39 (L) >60 mL/min/1.73    Globulin 2.7 1.8 - 3.5 gm/dL    A/G Ratio 1.5 1.1 - 2.4 g/dL    BUN/Creatinine Ratio 24.4 7.3 - 30.0    Anion Gap 11.5 mmol/L   Magnesium   Result Value Ref Range    Magnesium 1.7 (L) 1.8 - 2.5 mg/dL   Phosphorus   Result Value Ref Range    Phosphorus 4.5 2.5 - 4.5 mg/dL   CBC Auto Differential   Result Value Ref Range    WBC 5.38 4.00 - 10.00 10*3/mm3    RBC 4.62 3.90 - 5.00 10*6/mm3    Hemoglobin 14.9 11.5 - 14.9 g/dL    Hematocrit 45.6 (H) 34.0 - 45.0 %    MCV 98.7 (H) 83.0 - 97.0 fL    MCH 32.3 27.0 - 33.0 pg    MCHC 32.7 32.0 - 35.0 g/dL    RDW 13.1 11.7 - 14.5 %    RDW-SD 47.8 37.0 - 49.0 fl    MPV 10.4 8.9 - 12.1 fL    Platelets 137 (L) 150 - 375 10*3/mm3    Neutrophil % 70.1 39.0 - 75.0 %    Lymphocyte % 19.0 (L) 20.0 - 49.0 %    Monocyte % 8.0 4.0 - 12.0 %    Eosinophil % 2.0 1.0 - 5.0 %    Basophil % 0.7 0.0 - 1.1 %    Immature Grans % 0.2 0.0 - 0.5 %    Neutrophils, Absolute 3.77 1.50 - 7.00 10*3/mm3    Lymphocytes, Absolute 1.02 1.00 - 3.50 10*3/mm3    Monocytes, Absolute 0.43 0.25 -  0.80 10*3/mm3    Eosinophils, Absolute 0.11 0.00 - 0.36 10*3/mm3    Basophils, Absolute 0.04 0.00 - 0.10 10*3/mm3    Immature Grans, Absolute 0.01 0.00 - 0.03 10*3/mm3    nRBC 0.0 0.0 - 0.0 /100 WBC       IMAGING:      Screening mammogram 8/24/2017 reds category 0.  Follow-up diagnostic imaging 9/7/2017 BI-RADS Category 3 follow-up in 6 months recommended.    DEXA scan on 5/2/2017 shows osteoporosis with stable findings.    Diagnostic right mammogram on 5/7/2018 BI-RADS Category 2.  Routine follow-up mammography recommended.      ASSESSMENT:  This is a 77 y.o. female with:  1.  History of pathologic stage IIIc invasive mammary carcinoma of the lower outer left breast.  She had a left mastectomy and subsequently completed adjuvant chemotherapy with Adriamycin and Cytoxan followed by Taxol, with chemotherapy initiated on 10/5/2012.  Chemotherapy was complete as of 3/14/2013.  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  Anastrozole 1 mg daily was initiated in April 2013 and therefore 5 years were complete as of April 2018.  We discussed the possibility of discontinuing the medication.  I advised her that the benefits will last for a few more years at least.  She is reluctant to stop.  I advised her that her osteoporosis and severe osteoarthritis would be reasons to discontinue the medication.  However, she wants to remain on it at this time.  She may come off of the medicine for a few months in the near future and if she does well she may consent discontinuing the medication altogether.    2.  Osteoporosis: Reclast was initiated in April 2013 with plans for annual therapy.   DEXA scan on 5/2/2017 with no significant change from prior.  Reclast today.      PLAN:   1.  Continue anastrozole 1 mg daily for now.  2.  Reclast today and annually.   3.  DEXA scan will be due in May 2019  4.  Return in 6 months for follow-up with labs including a CBC, CMP,  25 OH vitamin D level, and breast exam   5.  A right screening mammogram will be due in May 2018 as well.

## 2018-05-15 ENCOUNTER — APPOINTMENT (OUTPATIENT)
Dept: ONCOLOGY | Facility: HOSPITAL | Age: 78
End: 2018-05-15

## 2018-06-22 RX ORDER — ANASTROZOLE 1 MG/1
TABLET ORAL
Qty: 90 TABLET | Refills: 3 | Status: SHIPPED | OUTPATIENT
Start: 2018-06-22 | End: 2019-05-20 | Stop reason: SDUPTHER

## 2018-07-05 DIAGNOSIS — E04.2 GOITER, NONTOXIC, MULTINODULAR: ICD-10-CM

## 2018-07-05 DIAGNOSIS — E04.1 THYROID NODULE: Primary | ICD-10-CM

## 2018-07-13 ENCOUNTER — RESULTS ENCOUNTER (OUTPATIENT)
Dept: ENDOCRINOLOGY | Age: 78
End: 2018-07-13

## 2018-07-13 DIAGNOSIS — E04.1 THYROID NODULE: ICD-10-CM

## 2018-07-13 DIAGNOSIS — E04.2 GOITER, NONTOXIC, MULTINODULAR: ICD-10-CM

## 2018-10-29 ENCOUNTER — OFFICE VISIT (OUTPATIENT)
Dept: ONCOLOGY | Facility: CLINIC | Age: 78
End: 2018-10-29
Attending: INTERNAL MEDICINE

## 2018-10-29 ENCOUNTER — TELEPHONE (OUTPATIENT)
Dept: ONCOLOGY | Facility: CLINIC | Age: 78
End: 2018-10-29

## 2018-10-29 ENCOUNTER — LAB (OUTPATIENT)
Dept: LAB | Facility: HOSPITAL | Age: 78
End: 2018-10-29
Attending: INTERNAL MEDICINE

## 2018-10-29 VITALS
HEART RATE: 63 BPM | SYSTOLIC BLOOD PRESSURE: 112 MMHG | RESPIRATION RATE: 14 BRPM | WEIGHT: 156 LBS | HEIGHT: 62 IN | OXYGEN SATURATION: 92 % | DIASTOLIC BLOOD PRESSURE: 68 MMHG | TEMPERATURE: 98.1 F | BODY MASS INDEX: 28.71 KG/M2

## 2018-10-29 DIAGNOSIS — Z85.3 HISTORY OF BREAST CANCER: ICD-10-CM

## 2018-10-29 DIAGNOSIS — M81.0 OSTEOPOROSIS WITHOUT CURRENT PATHOLOGICAL FRACTURE, UNSPECIFIED OSTEOPOROSIS TYPE: ICD-10-CM

## 2018-10-29 DIAGNOSIS — Z12.31 ENCOUNTER FOR SCREENING MAMMOGRAM FOR MALIGNANT NEOPLASM OF BREAST: ICD-10-CM

## 2018-10-29 DIAGNOSIS — Z85.3 HISTORY OF BREAST CANCER: Primary | ICD-10-CM

## 2018-10-29 LAB
25(OH)D3 SERPL-MCNC: 45.6 NG/ML (ref 30–100)
ALBUMIN SERPL-MCNC: 3.7 G/DL (ref 3.5–5.2)
ALBUMIN/GLOB SERPL: 1.6 G/DL (ref 1.1–2.4)
ALP SERPL-CCNC: 84 U/L (ref 38–116)
ALT SERPL W P-5'-P-CCNC: 9 U/L (ref 0–33)
ANION GAP SERPL CALCULATED.3IONS-SCNC: 12.1 MMOL/L
AST SERPL-CCNC: 19 U/L (ref 0–32)
BASOPHILS # BLD AUTO: 0.03 10*3/MM3 (ref 0–0.1)
BASOPHILS NFR BLD AUTO: 0.7 % (ref 0–1.1)
BILIRUB SERPL-MCNC: 0.5 MG/DL (ref 0.1–1.2)
BUN BLD-MCNC: 23 MG/DL (ref 6–20)
BUN/CREAT SERPL: 21.5 (ref 7.3–30)
CALCIUM SPEC-SCNC: 8.9 MG/DL (ref 8.5–10.2)
CHLORIDE SERPL-SCNC: 108 MMOL/L (ref 98–107)
CO2 SERPL-SCNC: 24.9 MMOL/L (ref 22–29)
CREAT BLD-MCNC: 1.07 MG/DL (ref 0.6–1.1)
DEPRECATED RDW RBC AUTO: 53.6 FL (ref 37–49)
EOSINOPHIL # BLD AUTO: 0.2 10*3/MM3 (ref 0–0.36)
EOSINOPHIL NFR BLD AUTO: 4.9 % (ref 1–5)
ERYTHROCYTE [DISTWIDTH] IN BLOOD BY AUTOMATED COUNT: 14.2 % (ref 11.7–14.5)
GFR SERPL CREATININE-BSD FRML MDRD: 50 ML/MIN/1.73
GLOBULIN UR ELPH-MCNC: 2.3 GM/DL (ref 1.8–3.5)
GLUCOSE BLD-MCNC: 164 MG/DL (ref 74–124)
HCT VFR BLD AUTO: 39.5 % (ref 34–45)
HGB BLD-MCNC: 13 G/DL (ref 11.5–14.9)
IMM GRANULOCYTES # BLD: 0.01 10*3/MM3 (ref 0–0.03)
IMM GRANULOCYTES NFR BLD: 0.2 % (ref 0–0.5)
LYMPHOCYTES # BLD AUTO: 0.88 10*3/MM3 (ref 1–3.5)
LYMPHOCYTES NFR BLD AUTO: 21.4 % (ref 20–49)
MCH RBC QN AUTO: 33.5 PG (ref 27–33)
MCHC RBC AUTO-ENTMCNC: 32.9 G/DL (ref 32–35)
MCV RBC AUTO: 101.8 FL (ref 83–97)
MONOCYTES # BLD AUTO: 0.48 10*3/MM3 (ref 0.25–0.8)
MONOCYTES NFR BLD AUTO: 11.7 % (ref 4–12)
NEUTROPHILS # BLD AUTO: 2.52 10*3/MM3 (ref 1.5–7)
NEUTROPHILS NFR BLD AUTO: 61.1 % (ref 39–75)
NRBC BLD MANUAL-RTO: 0 /100 WBC (ref 0–0)
PLATELET # BLD AUTO: 130 10*3/MM3 (ref 150–375)
PMV BLD AUTO: 10.2 FL (ref 8.9–12.1)
POTASSIUM BLD-SCNC: 4.3 MMOL/L (ref 3.5–4.7)
PROT SERPL-MCNC: 6 G/DL (ref 6.3–8)
RBC # BLD AUTO: 3.88 10*6/MM3 (ref 3.9–5)
SODIUM BLD-SCNC: 145 MMOL/L (ref 134–145)
WBC NRBC COR # BLD: 4.12 10*3/MM3 (ref 4–10)

## 2018-10-29 PROCEDURE — 99214 OFFICE O/P EST MOD 30 MIN: CPT | Performed by: INTERNAL MEDICINE

## 2018-10-29 PROCEDURE — 85025 COMPLETE CBC W/AUTO DIFF WBC: CPT | Performed by: INTERNAL MEDICINE

## 2018-10-29 PROCEDURE — 82306 VITAMIN D 25 HYDROXY: CPT | Performed by: INTERNAL MEDICINE

## 2018-10-29 PROCEDURE — 80053 COMPREHEN METABOLIC PANEL: CPT | Performed by: INTERNAL MEDICINE

## 2018-10-29 PROCEDURE — 36415 COLL VENOUS BLD VENIPUNCTURE: CPT | Performed by: INTERNAL MEDICINE

## 2018-10-29 RX ORDER — HYDROXYCHLOROQUINE SULFATE 200 MG/1
TABLET, FILM COATED ORAL DAILY
COMMUNITY
End: 2019-01-01 | Stop reason: ALTCHOICE

## 2018-10-29 NOTE — PROGRESS NOTES
[facility] GROUP OUTPATIENT CLINIC FOLLOW UP VISIT    REASON FOR FOLLOW-UP:    1.  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  She had a left mastectomy and subsequently completed adjuvant chemotherapy with Adriamycin and Cytoxan followed by Taxol, with chemotherapy initiated on 10/5/2012.  Chemotherapy was complete as of 3/14/2013.  2.  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  3.  Anastrozole 1 mg daily was initiated in April 2013.  4.  Osteoporosis: Reclast was initiated in April 2013 with plans for annual therapy.  Bone density on 5/2/2017 shows osteoporosis with stable findings.    HISTORY OF PRESENT ILLNESS:  Michelle Peña is a 78 y.o. female who returns today for follow up of the above issue.      She continues to have severe osteoarthritis pain in her hands and knees.  She was just prescribed hydroxychloroquine for this.  She continues anastrozole which she otherwise tolerates well aside from the pain.  She remains reluctant to discontinue this.  She denies any new problems with the left chest wall or right breast.    PAST MEDICAL, SURGICAL, FAMILY, AND SOCIAL HISTORIES WERE REVIEWED WITH THE PATIENT AND IN THE ELECTRONIC MEDICAL RECORD, AND WERE UPDATED IF INDICATED.    ALLERGIES:  Allergies   Allergen Reactions   • Augmentin [Amoxicillin-Pot Clavulanate]    • Penicillins        MEDICATIONS:  The medication list has been reviewed with the patient by the medical assistant, and the list has been updated in the electronic medical record, which I reviewed.  Medication dosages and frequencies were confirmed to be accurate.    REVIEW OF SYSTEMS:  PAIN:  See Vital Signs below.  GENERAL:  No fevers, chills, night sweats, or unintended weight loss.  Mild fatigue.  SKIN:  No rashes or non-healing lesions.  HEME/LYMPH:  No abnormal bleeding.  No palpable lymphadenopathy.  EYES:  No vision changes or  "diplopia.  ENT:  No sore throat or difficulty swallowing.  RESPIRATORY:  No cough, shortness of breath, hemoptysis, or wheezing.  CARDIOVASCULAR:  No chest pain, palpitations, orthopnea, or dyspnea on exertion.  GASTROINTESTINAL:  No abdominal pain, nausea, vomiting, constipation, diarrhea, melena, or hematochezia.  GENITOURINARY:  No dysuria or hematuria.  MUSCULOSKELETAL:  Persistent osteoarthritis pain in her knees and hands which has been more severe recently.  NEUROLOGIC:  No dizziness, loss of consciousness, or seizures.  No neurologic effects following her stroke.  PSYCHIATRIC:  No depression, anxiety, or mood changes.    Vitals:    10/29/18 1425   BP: 112/68   Pulse: 63   Resp: 14   Temp: 98.1 °F (36.7 °C)   TempSrc: Oral   SpO2: 92%   Weight: 70.8 kg (156 lb)   Height: 156.5 cm (61.61\")   PainSc: 10-Worst pain ever   PainLoc: Comment: BIlateral knee and back       PHYSICAL EXAMINATION:  GENERAL:  Well-developed well-nourished female; awake, alert and oriented, in no acute distress.  SKIN:  Warm and dry, without rashes, purpura, or petechiae.  HEAD:  Normocephalic, atraumatic.  EYES:  Pupils equal, round and reactive to light.  Extraocular movements intact.  Conjunctivae normal.  EARS:  Hearing intact.  NOSE:  Septum midline.  No excoriations or nasal discharge.  MOUTH:  No stomatitis or ulcers.  Lips are normal.  THROAT:  Oropharynx without lesions or exudates.  NECK:  Supple with good range of motion; no thyromegaly or masses; no JVD or bruits.  LYMPHATICS:  No cervical, supraclavicular, axillary, or inguinal lymphadenopathy.  CHEST:  Lungs are clear to auscultation bilaterally.  No wheezes, rales, or rhonchi.  An ICD is present in the left subclavian area.  BREASTS:  The right breast and left chest wall were examined today.  No nodules or nipple discharge in the right breast.  No skin changes or subcutaneous nodules in the left chest wall.  HEART:  Regular rate; normal rhythm.  No murmurs, gallops or " rubs.  ABDOMEN:  Soft, non-tender, non-distended.  Normal active bowel sounds.  No organomegaly.  EXTREMITIES:  No clubbing, cyanosis, or edema.  Osteoarthritic changes present in her knees.  NEUROLOGICAL:  No focal neurologic deficits.    DIAGNOSTIC DATA:  Results for orders placed or performed in visit on 10/29/18   CBC Auto Differential   Result Value Ref Range    WBC 4.12 4.00 - 10.00 10*3/mm3    RBC 3.88 (L) 3.90 - 5.00 10*6/mm3    Hemoglobin 13.0 11.5 - 14.9 g/dL    Hematocrit 39.5 34.0 - 45.0 %    .8 (H) 83.0 - 97.0 fL    MCH 33.5 (H) 27.0 - 33.0 pg    MCHC 32.9 32.0 - 35.0 g/dL    RDW 14.2 11.7 - 14.5 %    RDW-SD 53.6 (H) 37.0 - 49.0 fl    MPV 10.2 8.9 - 12.1 fL    Platelets 130 (L) 150 - 375 10*3/mm3    Neutrophil % 61.1 39.0 - 75.0 %    Lymphocyte % 21.4 20.0 - 49.0 %    Monocyte % 11.7 4.0 - 12.0 %    Eosinophil % 4.9 1.0 - 5.0 %    Basophil % 0.7 0.0 - 1.1 %    Immature Grans % 0.2 0.0 - 0.5 %    Neutrophils, Absolute 2.52 1.50 - 7.00 10*3/mm3    Lymphocytes, Absolute 0.88 (L) 1.00 - 3.50 10*3/mm3    Monocytes, Absolute 0.48 0.25 - 0.80 10*3/mm3    Eosinophils, Absolute 0.20 0.00 - 0.36 10*3/mm3    Basophils, Absolute 0.03 0.00 - 0.10 10*3/mm3    Immature Grans, Absolute 0.01 0.00 - 0.03 10*3/mm3    nRBC 0.0 0.0 - 0.0 /100 WBC       IMAGING:      Screening mammogram 8/24/2017 reds category 0.  Follow-up diagnostic imaging 9/7/2017 BI-RADS Category 3 follow-up in 6 months recommended.    DEXA scan on 5/2/2017 shows osteoporosis with stable findings.    Diagnostic right mammogram on 5/7/2018 BI-RADS Category 2.  Routine follow-up mammography recommended.      ASSESSMENT:  This is a 78 y.o. female with:  1.  History of pathologic stage IIIc invasive mammary carcinoma of the lower outer left breast.  She had a left mastectomy and subsequently completed adjuvant chemotherapy with Adriamycin and Cytoxan followed by Taxol, with chemotherapy initiated on 10/5/2012.  Chemotherapy was complete as of  3/14/2013.  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  Anastrozole 1 mg daily was initiated in April 2013 and therefore 5 years were complete as of April 2018.  We discussed the possibility of discontinuing the medication.  I advised her that the benefits will last for a few more years at least.  She is reluctant to stop.  I advised her that her osteoporosis and severe osteoarthritis would be reasons to discontinue the medication.  However, she wants to remain on it at this time.  She may decide to discontinue the medication for 4-6 weeks in the near future to see if this helps her pain.    2.  Osteoporosis: Reclast was initiated in April 2013 with plans for annual therapy.   DEXA scan on 5/2/2017 with no significant change from prior.  Repeat DEXA scan in early May 2019 with plans for Reclast thereafter.     PLAN:   1.  Continue anastrozole 1 mg daily for now.  She may decide to hold this for 4-6 weeks in the near future to see if her pain improves.  She states that she skips doses occasionally and feels some improvement in her pain after skipping a dose but I don't think the pain should resolve that quickly.  2.  Reclast annually, due after 5/14/2019.    3.  DEXA scan will be due in May 2019.  Ordered today.  4.  A right screening mammogram will be due in May 2019 as well.  Ordered today.  5.  Return in May 2019 for follow-up with, labs,Reclast, and a breast exam

## 2018-10-29 NOTE — TELEPHONE ENCOUNTER
Attempted to contact, no answer, message left to call office for results.      ----- Message from Dipesh Monterroso MD sent at 10/29/2018  4:37 PM EDT -----  Please call the patient regarding her results.  Please let her know that her chemistries look good and her vitamin D level is normal.  Thanks,  AURELIO

## 2019-01-01 ENCOUNTER — OFFICE VISIT CONVERTED (OUTPATIENT)
Dept: SURGERY | Facility: CLINIC | Age: 79
End: 2019-01-01
Attending: PHYSICIAN ASSISTANT

## 2019-01-01 ENCOUNTER — CONVERSION ENCOUNTER (OUTPATIENT)
Dept: SURGERY | Facility: CLINIC | Age: 79
End: 2019-01-01

## 2019-01-01 ENCOUNTER — LAB (OUTPATIENT)
Dept: LAB | Facility: HOSPITAL | Age: 79
End: 2019-01-01

## 2019-01-01 ENCOUNTER — OFFICE VISIT (OUTPATIENT)
Dept: ONCOLOGY | Facility: CLINIC | Age: 79
End: 2019-01-01

## 2019-01-01 ENCOUNTER — HOSPITAL ENCOUNTER (OUTPATIENT)
Dept: OTHER | Facility: HOSPITAL | Age: 79
Discharge: HOME | End: 2019-12-03
Attending: PHYSICIAN ASSISTANT

## 2019-01-01 ENCOUNTER — HOSPITAL ENCOUNTER (OUTPATIENT)
Dept: PHYSICAL THERAPY | Facility: CLINIC | Age: 79
Setting detail: RECURRING SERIES
Discharge: HOME | End: 2020-01-22
Attending: PHYSICIAN ASSISTANT

## 2019-01-01 ENCOUNTER — HOSPITAL ENCOUNTER (OUTPATIENT)
Dept: SURGERY | Facility: CLINIC | Age: 79
Discharge: HOME | End: 2019-11-13
Attending: PHYSICIAN ASSISTANT

## 2019-01-01 VITALS
OXYGEN SATURATION: 95 % | TEMPERATURE: 97.6 F | HEIGHT: 62 IN | DIASTOLIC BLOOD PRESSURE: 68 MMHG | HEART RATE: 71 BPM | WEIGHT: 147 LBS | RESPIRATION RATE: 16 BRPM | BODY MASS INDEX: 27.05 KG/M2 | SYSTOLIC BLOOD PRESSURE: 106 MMHG

## 2019-01-01 DIAGNOSIS — C50.512 MALIGNANT NEOPLASM OF LOWER-OUTER QUADRANT OF LEFT BREAST OF FEMALE, ESTROGEN RECEPTOR POSITIVE (HCC): ICD-10-CM

## 2019-01-01 DIAGNOSIS — M81.0 OSTEOPOROSIS WITHOUT CURRENT PATHOLOGICAL FRACTURE, UNSPECIFIED OSTEOPOROSIS TYPE: ICD-10-CM

## 2019-01-01 DIAGNOSIS — Z85.3 HISTORY OF BREAST CANCER: Primary | ICD-10-CM

## 2019-01-01 DIAGNOSIS — Z17.0 MALIGNANT NEOPLASM OF LOWER-OUTER QUADRANT OF LEFT BREAST OF FEMALE, ESTROGEN RECEPTOR POSITIVE (HCC): ICD-10-CM

## 2019-01-01 LAB
AMOXICILLIN+CLAV SUSC ISLT: 8
AMOXICILLIN+CLAV SUSC ISLT: <=2
AMPICILLIN SUSC ISLT: 4
AMPICILLIN SUSC ISLT: >=32
AMPICILLIN+SULBAC SUSC ISLT: 8
AMPICILLIN+SULBAC SUSC ISLT: <=2
BACTERIA UR CULT: ABNORMAL
BACTERIA UR CULT: ABNORMAL
BASOPHILS # BLD AUTO: 0.03 10*3/MM3 (ref 0–0.2)
BASOPHILS NFR BLD AUTO: 0.8 % (ref 0–1.5)
CEFAZOLIN SUSC ISLT: <=4
CEFAZOLIN SUSC ISLT: <=4
CEFEPIME SUSC ISLT: <=1
CEFEPIME SUSC ISLT: <=1
CEFTAZIDIME SUSC ISLT: <=1
CEFTAZIDIME SUSC ISLT: <=1
CEFTRIAXONE SUSC ISLT: <=1
CEFTRIAXONE SUSC ISLT: <=1
CEFUROXIME ORAL SUSC ISLT: 4
CEFUROXIME ORAL SUSC ISLT: <=1
CEFUROXIME PARENTER SUSC ISLT: 4
CEFUROXIME PARENTER SUSC ISLT: <=1
CIPROFLOXACIN SUSC ISLT: <=0.25
CIPROFLOXACIN SUSC ISLT: >=4
DEPRECATED RDW RBC AUTO: 50.8 FL (ref 37–54)
EOSINOPHIL # BLD AUTO: 0.16 10*3/MM3 (ref 0–0.4)
EOSINOPHIL NFR BLD AUTO: 4.1 % (ref 0.3–6.2)
ERTAPENEM SUSC ISLT: <=0.5
ERTAPENEM SUSC ISLT: <=0.5
ERYTHROCYTE [DISTWIDTH] IN BLOOD BY AUTOMATED COUNT: 13.2 % (ref 12.3–15.4)
GENTAMICIN SUSC ISLT: <=1
GENTAMICIN SUSC ISLT: <=1
HCT VFR BLD AUTO: 42.1 % (ref 34–46.6)
HGB BLD-MCNC: 14.1 G/DL (ref 12–15.9)
IMM GRANULOCYTES # BLD AUTO: 0.04 10*3/MM3 (ref 0–0.05)
IMM GRANULOCYTES NFR BLD AUTO: 1 % (ref 0–0.5)
LEVOFLOXACIN SUSC ISLT: <=0.12
LEVOFLOXACIN SUSC ISLT: >=8
LYMPHOCYTES # BLD AUTO: 0.58 10*3/MM3 (ref 0.7–3.1)
LYMPHOCYTES NFR BLD AUTO: 14.9 % (ref 19.6–45.3)
MCH RBC QN AUTO: 35 PG (ref 26.6–33)
MCHC RBC AUTO-ENTMCNC: 33.5 G/DL (ref 31.5–35.7)
MCV RBC AUTO: 104.5 FL (ref 79–97)
MONOCYTES # BLD AUTO: 0.4 10*3/MM3 (ref 0.1–0.9)
MONOCYTES NFR BLD AUTO: 10.3 % (ref 5–12)
NEUTROPHILS # BLD AUTO: 2.68 10*3/MM3 (ref 1.7–7)
NEUTROPHILS NFR BLD AUTO: 68.9 % (ref 42.7–76)
NITROFURANTOIN SUSC ISLT: 64
NITROFURANTOIN SUSC ISLT: <=16
NRBC BLD AUTO-RTO: 0 /100 WBC (ref 0–0.2)
PLATELET # BLD AUTO: 106 10*3/MM3 (ref 140–450)
PMV BLD AUTO: 10.5 FL (ref 6–12)
RBC # BLD AUTO: 4.03 10*6/MM3 (ref 3.77–5.28)
TETRACYCLINE SUSC ISLT: <=1
TETRACYCLINE SUSC ISLT: <=1
TMP SMX SUSC ISLT: <=20
TMP SMX SUSC ISLT: <=20
TOBRAMYCIN SUSC ISLT: <=1
TOBRAMYCIN SUSC ISLT: <=1
WBC NRBC COR # BLD: 3.89 10*3/MM3 (ref 3.4–10.8)

## 2019-01-01 PROCEDURE — G0463 HOSPITAL OUTPT CLINIC VISIT: HCPCS | Performed by: INTERNAL MEDICINE

## 2019-01-01 PROCEDURE — 85025 COMPLETE CBC W/AUTO DIFF WBC: CPT

## 2019-01-01 PROCEDURE — 36415 COLL VENOUS BLD VENIPUNCTURE: CPT

## 2019-01-01 PROCEDURE — 99214 OFFICE O/P EST MOD 30 MIN: CPT | Performed by: INTERNAL MEDICINE

## 2019-01-01 RX ORDER — CEFDINIR 300 MG/1
CAPSULE ORAL
COMMUNITY
Start: 2019-08-13 | End: 2019-01-01

## 2019-01-01 RX ORDER — ONDANSETRON 4 MG/1
TABLET, FILM COATED ORAL
COMMUNITY
Start: 2019-08-08 | End: 2019-01-01 | Stop reason: SDDI

## 2019-01-01 RX ORDER — NITROFURANTOIN 25; 75 MG/1; MG/1
CAPSULE ORAL
COMMUNITY
Start: 2019-01-01 | End: 2019-01-01

## 2019-01-01 RX ORDER — SULFAMETHOXAZOLE AND TRIMETHOPRIM 800; 160 MG/1; MG/1
TABLET ORAL
COMMUNITY
Start: 2019-08-08 | End: 2019-01-01

## 2019-01-18 ENCOUNTER — OFFICE VISIT CONVERTED (OUTPATIENT)
Dept: ORTHOPEDIC SURGERY | Facility: CLINIC | Age: 79
End: 2019-01-18
Attending: PHYSICIAN ASSISTANT

## 2019-01-23 ENCOUNTER — OFFICE VISIT CONVERTED (OUTPATIENT)
Dept: OTHER | Facility: HOSPITAL | Age: 79
End: 2019-01-23
Attending: ORTHOPAEDIC SURGERY

## 2019-05-14 ENCOUNTER — HOSPITAL ENCOUNTER (OUTPATIENT)
Dept: MAMMOGRAPHY | Facility: HOSPITAL | Age: 79
Discharge: HOME | End: 2019-05-14
Attending: INTERNAL MEDICINE | Admitting: INTERNAL MEDICINE

## 2019-05-14 ENCOUNTER — HOSPITAL ENCOUNTER (OUTPATIENT)
Dept: BONE DENSITY | Facility: HOSPITAL | Age: 79
Discharge: HOME | End: 2019-05-14
Attending: INTERNAL MEDICINE

## 2019-05-14 DIAGNOSIS — Z85.3 HISTORY OF BREAST CANCER: ICD-10-CM

## 2019-05-14 DIAGNOSIS — Z12.31 ENCOUNTER FOR SCREENING MAMMOGRAM FOR MALIGNANT NEOPLASM OF BREAST: ICD-10-CM

## 2019-05-14 DIAGNOSIS — M81.0 OSTEOPOROSIS WITHOUT CURRENT PATHOLOGICAL FRACTURE, UNSPECIFIED OSTEOPOROSIS TYPE: ICD-10-CM

## 2019-05-14 PROCEDURE — 77067 SCR MAMMO BI INCL CAD: CPT

## 2019-05-14 PROCEDURE — 77080 DXA BONE DENSITY AXIAL: CPT

## 2019-05-16 ENCOUNTER — OFFICE VISIT CONVERTED (OUTPATIENT)
Dept: ORTHOPEDIC SURGERY | Facility: CLINIC | Age: 79
End: 2019-05-16
Attending: ORTHOPAEDIC SURGERY

## 2019-05-20 ENCOUNTER — INFUSION (OUTPATIENT)
Dept: ONCOLOGY | Facility: HOSPITAL | Age: 79
End: 2019-05-20

## 2019-05-20 ENCOUNTER — APPOINTMENT (OUTPATIENT)
Dept: ONCOLOGY | Facility: HOSPITAL | Age: 79
End: 2019-05-20

## 2019-05-20 ENCOUNTER — OFFICE VISIT (OUTPATIENT)
Dept: ONCOLOGY | Facility: CLINIC | Age: 79
End: 2019-05-20

## 2019-05-20 VITALS
OXYGEN SATURATION: 97 % | HEART RATE: 88 BPM | WEIGHT: 146.1 LBS | RESPIRATION RATE: 16 BRPM | BODY MASS INDEX: 26.89 KG/M2 | TEMPERATURE: 97.7 F | DIASTOLIC BLOOD PRESSURE: 57 MMHG | HEIGHT: 62 IN | SYSTOLIC BLOOD PRESSURE: 96 MMHG

## 2019-05-20 DIAGNOSIS — C50.512 MALIGNANT NEOPLASM OF LOWER-OUTER QUADRANT OF LEFT BREAST OF FEMALE, ESTROGEN RECEPTOR POSITIVE (HCC): ICD-10-CM

## 2019-05-20 DIAGNOSIS — C50.512 MALIGNANT NEOPLASM OF LOWER-OUTER QUADRANT OF LEFT BREAST OF FEMALE, ESTROGEN RECEPTOR POSITIVE (HCC): Primary | ICD-10-CM

## 2019-05-20 DIAGNOSIS — M81.0 OSTEOPOROSIS WITHOUT CURRENT PATHOLOGICAL FRACTURE, UNSPECIFIED OSTEOPOROSIS TYPE: ICD-10-CM

## 2019-05-20 DIAGNOSIS — Z17.0 MALIGNANT NEOPLASM OF LOWER-OUTER QUADRANT OF LEFT BREAST OF FEMALE, ESTROGEN RECEPTOR POSITIVE (HCC): Primary | ICD-10-CM

## 2019-05-20 DIAGNOSIS — Z17.0 MALIGNANT NEOPLASM OF LOWER-OUTER QUADRANT OF LEFT BREAST OF FEMALE, ESTROGEN RECEPTOR POSITIVE (HCC): ICD-10-CM

## 2019-05-20 DIAGNOSIS — M81.0 OSTEOPOROSIS WITHOUT CURRENT PATHOLOGICAL FRACTURE, UNSPECIFIED OSTEOPOROSIS TYPE: Primary | ICD-10-CM

## 2019-05-20 LAB
ALBUMIN SERPL-MCNC: 3.8 G/DL (ref 3.5–5.2)
ALBUMIN/GLOB SERPL: 1.5 G/DL (ref 1.1–2.4)
ALP SERPL-CCNC: 85 U/L (ref 38–116)
ALT SERPL W P-5'-P-CCNC: 18 U/L (ref 0–33)
ANION GAP SERPL CALCULATED.3IONS-SCNC: 10.1 MMOL/L
AST SERPL-CCNC: 26 U/L (ref 0–32)
BASOPHILS # BLD AUTO: 0.03 10*3/MM3 (ref 0–0.2)
BASOPHILS NFR BLD AUTO: 0.6 % (ref 0–1.5)
BILIRUB SERPL-MCNC: 0.7 MG/DL (ref 0.2–1.2)
BUN BLD-MCNC: 24 MG/DL (ref 6–20)
BUN/CREAT SERPL: 17.3 (ref 7.3–30)
CALCIUM SPEC-SCNC: 9.3 MG/DL (ref 8.5–10.2)
CHLORIDE SERPL-SCNC: 104 MMOL/L (ref 98–107)
CO2 SERPL-SCNC: 29.9 MMOL/L (ref 22–29)
CREAT BLD-MCNC: 1.39 MG/DL (ref 0.6–1.1)
DEPRECATED RDW RBC AUTO: 50.7 FL (ref 37–54)
EOSINOPHIL # BLD AUTO: 0.2 10*3/MM3 (ref 0–0.4)
EOSINOPHIL NFR BLD AUTO: 4 % (ref 0.3–6.2)
ERYTHROCYTE [DISTWIDTH] IN BLOOD BY AUTOMATED COUNT: 13.3 % (ref 12.3–15.4)
GFR SERPL CREATININE-BSD FRML MDRD: 37 ML/MIN/1.73
GLOBULIN UR ELPH-MCNC: 2.5 GM/DL (ref 1.8–3.5)
GLUCOSE BLD-MCNC: 96 MG/DL (ref 74–124)
HCT VFR BLD AUTO: 46.2 % (ref 34–46.6)
HGB BLD-MCNC: 15.2 G/DL (ref 12–15.9)
IMM GRANULOCYTES # BLD AUTO: 0.01 10*3/MM3 (ref 0–0.05)
IMM GRANULOCYTES NFR BLD AUTO: 0.2 % (ref 0–0.5)
LYMPHOCYTES # BLD AUTO: 1 10*3/MM3 (ref 0.7–3.1)
LYMPHOCYTES NFR BLD AUTO: 20.1 % (ref 19.6–45.3)
MAGNESIUM SERPL-MCNC: 1.7 MG/DL (ref 1.8–2.5)
MCH RBC QN AUTO: 33.8 PG (ref 26.6–33)
MCHC RBC AUTO-ENTMCNC: 32.9 G/DL (ref 31.5–35.7)
MCV RBC AUTO: 102.7 FL (ref 79–97)
MONOCYTES # BLD AUTO: 0.48 10*3/MM3 (ref 0.1–0.9)
MONOCYTES NFR BLD AUTO: 9.7 % (ref 5–12)
NEUTROPHILS # BLD AUTO: 3.25 10*3/MM3 (ref 1.7–7)
NEUTROPHILS NFR BLD AUTO: 65.4 % (ref 42.7–76)
NRBC BLD AUTO-RTO: 0 /100 WBC (ref 0–0.2)
PHOSPHATE SERPL-MCNC: 4.1 MG/DL (ref 2.5–4.5)
PLATELET # BLD AUTO: 125 10*3/MM3 (ref 140–450)
PMV BLD AUTO: 10.2 FL (ref 6–12)
POTASSIUM BLD-SCNC: 3.9 MMOL/L (ref 3.5–4.7)
PROT SERPL-MCNC: 6.3 G/DL (ref 6.3–8)
RBC # BLD AUTO: 4.5 10*6/MM3 (ref 3.77–5.28)
SODIUM BLD-SCNC: 144 MMOL/L (ref 134–145)
WBC NRBC COR # BLD: 4.97 10*3/MM3 (ref 3.4–10.8)

## 2019-05-20 PROCEDURE — 83735 ASSAY OF MAGNESIUM: CPT

## 2019-05-20 PROCEDURE — 99214 OFFICE O/P EST MOD 30 MIN: CPT | Performed by: INTERNAL MEDICINE

## 2019-05-20 PROCEDURE — 80053 COMPREHEN METABOLIC PANEL: CPT

## 2019-05-20 PROCEDURE — 85025 COMPLETE CBC W/AUTO DIFF WBC: CPT

## 2019-05-20 PROCEDURE — 84100 ASSAY OF PHOSPHORUS: CPT

## 2019-05-20 RX ORDER — ANASTROZOLE 1 MG/1
1 TABLET ORAL DAILY
Qty: 90 TABLET | Refills: 3 | Status: SHIPPED | OUTPATIENT
Start: 2019-05-20 | End: 2020-01-01

## 2019-05-20 RX ORDER — ZOLEDRONIC ACID 5 MG/100ML
5 INJECTION, SOLUTION INTRAVENOUS ONCE
Status: CANCELLED | OUTPATIENT
Start: 2019-05-20

## 2019-05-20 RX ORDER — SODIUM CHLORIDE 9 MG/ML
250 INJECTION, SOLUTION INTRAVENOUS ONCE
Status: CANCELLED | OUTPATIENT
Start: 2019-05-20

## 2019-05-20 NOTE — PROGRESS NOTES
[facility] GROUP OUTPATIENT CLINIC FOLLOW UP VISIT    REASON FOR FOLLOW-UP:    1.  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  She had a left mastectomy and subsequently completed adjuvant chemotherapy with Adriamycin and Cytoxan followed by Taxol, with chemotherapy initiated on 10/5/2012.  Chemotherapy was complete as of 3/14/2013.  2.  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  3.  Anastrozole 1 mg daily was initiated in April 2013.  4.  Osteoporosis: Reclast was initiated in April 2013 with plans for annual therapy.  Bone density on 5/2/2017 shows osteoporosis with stable findings.    HISTORY OF PRESENT ILLNESS:  Michelle Peña is a 78 y.o. female who returns today for follow up of the above issue.      Osteoarthritis pain in her knees is better after some injections.  She is considering having total knee arthroplasty.  She reports persistent fatigue.  She denies any new problems with her breasts.  She is also on Plaquenil and states that her pain is overall better on this.  She tolerates the anastrozole well otherwise.  She does desire to continue this medication.    PAST MEDICAL, SURGICAL, FAMILY, AND SOCIAL HISTORIES WERE REVIEWED WITH THE PATIENT AND IN THE ELECTRONIC MEDICAL RECORD, AND WERE UPDATED IF INDICATED.    ALLERGIES:  Allergies   Allergen Reactions   • Augmentin [Amoxicillin-Pot Clavulanate]    • Penicillins        MEDICATIONS:  The medication list has been reviewed with the patient by the medical assistant, and the list has been updated in the electronic medical record, which I reviewed.  Medication dosages and frequencies were confirmed to be accurate.    REVIEW OF SYSTEMS:  PAIN:  See Vital Signs below.  GENERAL:  No fevers, chills, night sweats, or unintended weight loss.  Mild fatigue.  SKIN:  No rashes or non-healing lesions.  HEME/LYMPH:  No abnormal bleeding.  No palpable  "lymphadenopathy.  EYES:  No vision changes or diplopia.  ENT:  No sore throat or difficulty swallowing.  RESPIRATORY:  No cough, shortness of breath, hemoptysis, or wheezing.  CARDIOVASCULAR:  No chest pain, palpitations, orthopnea, or dyspnea on exertion.  GASTROINTESTINAL:  No abdominal pain, nausea, vomiting, constipation, diarrhea, melena, or hematochezia.  GENITOURINARY:  No dysuria or hematuria.  MUSCULOSKELETAL:  Persistent osteoarthritis pain in her knees and hands which has been better recently.  NEUROLOGIC:  No dizziness, loss of consciousness, or seizures.  No neurologic effects following her stroke.  PSYCHIATRIC:  No depression, anxiety, or mood changes.    Vitals:    05/20/19 1115   BP: 96/57   Pulse: 88   Resp: 16   Temp: 97.7 °F (36.5 °C)   TempSrc: Oral   SpO2: 97%   Weight: 66.3 kg (146 lb 1.6 oz)   Height: 156.5 cm (61.61\")   PainSc: 0-No pain  Comment: breast cancer       PHYSICAL EXAMINATION:  GENERAL:  Well-developed well-nourished female; awake, alert and oriented, in no acute distress.  SKIN:  Warm and dry, without rashes, purpura, or petechiae.  HEAD:  Normocephalic, atraumatic.  EYES:  Pupils equal, round and reactive to light.  Extraocular movements intact.  Conjunctivae normal.  EARS:  Hearing intact.  NOSE:  Septum midline.  No excoriations or nasal discharge.  MOUTH:  No stomatitis or ulcers.  Lips are normal.  THROAT:  Oropharynx without lesions or exudates.  NECK:  Supple with good range of motion; no thyromegaly or masses; no JVD or bruits.  LYMPHATICS:  No cervical, supraclavicular, axillary, or inguinal lymphadenopathy.  CHEST:  Lungs are clear to auscultation bilaterally.  No wheezes, rales, or rhonchi.  An ICD is present in the left subclavian area.  BREASTS:  The right breast and left chest wall were examined today.  No nodules or nipple discharge in the right breast.  No skin changes or subcutaneous nodules in the left chest wall.  Examination unchanged from prior.  HEART:  " Regular rate; normal rhythm.  No murmurs, gallops or rubs.  ABDOMEN:  Soft, non-tender, non-distended.  Normal active bowel sounds.  No organomegaly.  EXTREMITIES:  No clubbing, cyanosis, or edema.  Osteoarthritic changes present in her knees.  NEUROLOGICAL:  No focal neurologic deficits.    DIAGNOSTIC DATA:  Results for orders placed or performed in visit on 05/20/19   Comprehensive Metabolic Panel   Result Value Ref Range    Glucose 96 74 - 124 mg/dL    BUN 24 (H) 6 - 20 mg/dL    Creatinine 1.39 (H) 0.60 - 1.10 mg/dL    Sodium 144 134 - 145 mmol/L    Potassium 3.9 3.5 - 4.7 mmol/L    Chloride 104 98 - 107 mmol/L    CO2 29.9 (H) 22.0 - 29.0 mmol/L    Calcium 9.3 8.5 - 10.2 mg/dL    Total Protein 6.3 6.3 - 8.0 g/dL    Albumin 3.80 3.50 - 5.20 g/dL    ALT (SGPT) 18 0 - 33 U/L    AST (SGOT) 26 0 - 32 U/L    Alkaline Phosphatase 85 38 - 116 U/L    Total Bilirubin 0.7 0.2 - 1.2 mg/dL    eGFR Non African Amer 37 (L) >60 mL/min/1.73    Globulin 2.5 1.8 - 3.5 gm/dL    A/G Ratio 1.5 1.1 - 2.4 g/dL    BUN/Creatinine Ratio 17.3 7.3 - 30.0    Anion Gap 10.1 mmol/L   Magnesium   Result Value Ref Range    Magnesium 1.7 (L) 1.8 - 2.5 mg/dL   Phosphorus   Result Value Ref Range    Phosphorus 4.1 2.5 - 4.5 mg/dL   CBC Auto Differential   Result Value Ref Range    WBC 4.97 3.40 - 10.80 10*3/mm3    RBC 4.50 3.77 - 5.28 10*6/mm3    Hemoglobin 15.2 12.0 - 15.9 g/dL    Hematocrit 46.2 34.0 - 46.6 %    .7 (H) 79.0 - 97.0 fL    MCH 33.8 (H) 26.6 - 33.0 pg    MCHC 32.9 31.5 - 35.7 g/dL    RDW 13.3 12.3 - 15.4 %    RDW-SD 50.7 37.0 - 54.0 fl    MPV 10.2 6.0 - 12.0 fL    Platelets 125 (L) 140 - 450 10*3/mm3    Neutrophil % 65.4 42.7 - 76.0 %    Lymphocyte % 20.1 19.6 - 45.3 %    Monocyte % 9.7 5.0 - 12.0 %    Eosinophil % 4.0 0.3 - 6.2 %    Basophil % 0.6 0.0 - 1.5 %    Immature Grans % 0.2 0.0 - 0.5 %    Neutrophils, Absolute 3.25 1.70 - 7.00 10*3/mm3    Lymphocytes, Absolute 1.00 0.70 - 3.10 10*3/mm3    Monocytes, Absolute 0.48 0.10  - 0.90 10*3/mm3    Eosinophils, Absolute 0.20 0.00 - 0.40 10*3/mm3    Basophils, Absolute 0.03 0.00 - 0.20 10*3/mm3    Immature Grans, Absolute 0.01 0.00 - 0.05 10*3/mm3    nRBC 0.0 0.0 - 0.2 /100 WBC       IMAGING:      Screening mammogram 8/24/2017 reds category 0.  Follow-up diagnostic imaging 9/7/2017 BI-RADS Category 3 follow-up in 6 months recommended.    DEXA scan on 5/2/2017 shows osteoporosis with stable findings.    Diagnostic right mammogram on 5/7/2018 BI-RADS Category 2.  Routine follow-up mammography recommended.    Screening mammogram 5/14/2019:    IMPRESSION:  There are no findings suspicious for malignancy in the right  breast. Routine follow-up mammography is recommended.     BI-RADS Category 1: Negative.      DEXA scan 5/14/2019:  IMPRESSION:  1. T-score is lowest within the right femoral neck where it indicates  osteoporosis.  2. When compared to the prior exam 05/02/2017 BMD of the lumbar spine is  increased 3% and BMD at the left femoral neck is increased 8% and BMD  the right femoral neck is increased 3%.        ASSESSMENT:  This is a 78 y.o. female with:  1.  History of pathologic stage IIIc invasive mammary carcinoma of the lower outer left breast.  She had a left mastectomy and subsequently completed adjuvant chemotherapy with Adriamycin and Cytoxan followed by Taxol, with chemotherapy initiated on 10/5/2012.  Chemotherapy was complete as of 3/14/2013.  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  Anastrozole 1 mg daily was initiated in April 2013 and therefore 5 years were complete as of April 2018.  We discussed the possibility of discontinuing the medication.  I advised her that the benefits will last for a few more years at least.  She is reluctant to stop.  I advised her that her osteoporosis and severe osteoarthritis would be reasons to discontinue the medication.  However, she wants to remain on it at this time.       2.  Osteoporosis: Reclast was initiated in April 2013 with plans for annual therapy.   DEXA scan on 5/2/2017 with no significant change from prior.  Repeat DEXA scan on 5/14/2019 does show some improvement.  She continues calcium and vitamin D.  She has had 6 annual Reclast infusions at this point.  Because we are seeing some improvement and because of the risks of ongoing therapy, I recommended not giving her Reclast today and reevaluating with the DEXA scan in 1 year.    PLAN:   1.  Continue anastrozole 1 mg daily for now.  Medication refilled today.  2.  Hold Reclast today.  3.  I will see her back in 6 months for follow-up with a CBC and breast examination.  4.  Plan repeat DEXA scan in 1 year and we will consider resuming Reclast at that time if her bone density has declined significantly.

## 2019-06-06 ENCOUNTER — OFFICE VISIT CONVERTED (OUTPATIENT)
Dept: ORTHOPEDIC SURGERY | Facility: CLINIC | Age: 79
End: 2019-06-06
Attending: PHYSICIAN ASSISTANT

## 2019-06-06 ENCOUNTER — CONVERSION ENCOUNTER (OUTPATIENT)
Dept: ORTHOPEDIC SURGERY | Facility: CLINIC | Age: 79
End: 2019-06-06

## 2019-08-15 ENCOUNTER — OFFICE VISIT CONVERTED (OUTPATIENT)
Dept: ORTHOPEDIC SURGERY | Facility: CLINIC | Age: 79
End: 2019-08-15
Attending: PHYSICIAN ASSISTANT

## 2019-08-23 ENCOUNTER — OFFICE VISIT CONVERTED (OUTPATIENT)
Dept: ORTHOPEDIC SURGERY | Facility: CLINIC | Age: 79
End: 2019-08-23
Attending: PHYSICIAN ASSISTANT

## 2019-11-11 NOTE — PROGRESS NOTES
[facility] GROUP OUTPATIENT CLINIC FOLLOW UP VISIT    REASON FOR FOLLOW-UP:    1.  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  She had a left mastectomy and subsequently completed adjuvant chemotherapy with Adriamycin and Cytoxan followed by Taxol, with chemotherapy initiated on 10/5/2012.  Chemotherapy was complete as of 3/14/2013.  2.  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  3.  Anastrozole 1 mg daily was initiated in April 2013.  4.  Osteoporosis: Reclast was initiated in April 2013 with plans for annual therapy.  Bone density on 5/2/2017 shows osteoporosis with stable findings.    HISTORY OF PRESENT ILLNESS:  Michelle Peña is a 79 y.o. female who returns today for follow up of the above issue.      She continues to have osteoarthritis pain in her knees.  She continues anastrozole which she tolerates well.  She had cataract surgery and is seeing better.  Otherwise no new problems today.    PAST MEDICAL, SURGICAL, FAMILY, AND SOCIAL HISTORIES WERE REVIEWED WITH THE PATIENT AND IN THE ELECTRONIC MEDICAL RECORD, AND WERE UPDATED IF INDICATED.    ALLERGIES:  Allergies   Allergen Reactions   • Augmentin [Amoxicillin-Pot Clavulanate]    • Penicillins        MEDICATIONS:  The medication list has been reviewed with the patient by the medical assistant, and the list has been updated in the electronic medical record, which I reviewed.  Medication dosages and frequencies were confirmed to be accurate.    REVIEW OF SYSTEMS:  PAIN:  See Vital Signs below.  GENERAL:  No fevers, chills, night sweats, or unintended weight loss.  Mild fatigue.  SKIN:  No rashes or non-healing lesions.  HEME/LYMPH:  No abnormal bleeding.  No palpable lymphadenopathy.  EYES:  No vision changes or diplopia.  ENT:  No sore throat or difficulty swallowing.  RESPIRATORY:  No cough, shortness of breath, hemoptysis, or  "wheezing.  CARDIOVASCULAR:  No chest pain, palpitations, orthopnea, or dyspnea on exertion.  GASTROINTESTINAL:  No abdominal pain, nausea, vomiting, constipation, diarrhea, melena, or hematochezia.  GENITOURINARY:  No dysuria or hematuria.  MUSCULOSKELETAL:  Persistent osteoarthritis pain in her knees and hands  NEUROLOGIC:  No dizziness, loss of consciousness, or seizures.  No neurologic effects following her stroke.  PSYCHIATRIC:  No depression, anxiety, or mood changes.    Vitals:    11/11/19 1127   BP: 106/68   Pulse: 71   Resp: 16   Temp: 97.6 °F (36.4 °C)   TempSrc: Oral   SpO2: 95%   Weight: 66.7 kg (147 lb)   Height: 156.5 cm (61.61\")   PainSc: 0-No pain  Comment: breast cancer       PHYSICAL EXAMINATION:  GENERAL:  Well-developed well-nourished female; awake, alert and oriented, in no acute distress.  SKIN:  Warm and dry, without rashes, purpura, or petechiae.  HEAD:  Normocephalic, atraumatic.  EYES:  Pupils equal, round and reactive to light.  Extraocular movements intact.  Conjunctivae normal.  EARS:  Hearing intact.  NOSE:  Septum midline.  No excoriations or nasal discharge.  MOUTH:  No stomatitis or ulcers.  Lips are normal.  THROAT:  Oropharynx without lesions or exudates.  NECK:  Supple with good range of motion; no thyromegaly or masses; no JVD or bruits.  LYMPHATICS:  No cervical, supraclavicular, axillary, or inguinal lymphadenopathy.  CHEST:  Lungs are clear to auscultation bilaterally.  No wheezes, rales, or rhonchi.  An ICD is present in the left subclavian area.  BREASTS:  The right breast and left chest wall were examined today.  No nodules or nipple discharge in the right breast.  No skin changes or subcutaneous nodules in the left chest wall.  Examination unchanged from prior again today.  HEART:  Regular rate; normal rhythm.  No murmurs, gallops or rubs.  ABDOMEN:  Soft, non-tender, non-distended.  Normal active bowel sounds.  No organomegaly.  EXTREMITIES:  No clubbing, cyanosis, or " edema.  Osteoarthritic changes present in her knees.  NEUROLOGICAL:  No focal neurologic deficits.    DIAGNOSTIC DATA:  Results for orders placed or performed in visit on 11/11/19   CBC Auto Differential   Result Value Ref Range    WBC 3.89 3.40 - 10.80 10*3/mm3    RBC 4.03 3.77 - 5.28 10*6/mm3    Hemoglobin 14.1 12.0 - 15.9 g/dL    Hematocrit 42.1 34.0 - 46.6 %    .5 (H) 79.0 - 97.0 fL    MCH 35.0 (H) 26.6 - 33.0 pg    MCHC 33.5 31.5 - 35.7 g/dL    RDW 13.2 12.3 - 15.4 %    RDW-SD 50.8 37.0 - 54.0 fl    MPV 10.5 6.0 - 12.0 fL    Platelets 106 (L) 140 - 450 10*3/mm3    Neutrophil % 68.9 42.7 - 76.0 %    Lymphocyte % 14.9 (L) 19.6 - 45.3 %    Monocyte % 10.3 5.0 - 12.0 %    Eosinophil % 4.1 0.3 - 6.2 %    Basophil % 0.8 0.0 - 1.5 %    Immature Grans % 1.0 (H) 0.0 - 0.5 %    Neutrophils, Absolute 2.68 1.70 - 7.00 10*3/mm3    Lymphocytes, Absolute 0.58 (L) 0.70 - 3.10 10*3/mm3    Monocytes, Absolute 0.40 0.10 - 0.90 10*3/mm3    Eosinophils, Absolute 0.16 0.00 - 0.40 10*3/mm3    Basophils, Absolute 0.03 0.00 - 0.20 10*3/mm3    Immature Grans, Absolute 0.04 0.00 - 0.05 10*3/mm3    nRBC 0.0 0.0 - 0.2 /100 WBC       IMAGING:      Screening mammogram 8/24/2017 reds category 0.  Follow-up diagnostic imaging 9/7/2017 BI-RADS Category 3 follow-up in 6 months recommended.    DEXA scan on 5/2/2017 shows osteoporosis with stable findings.    Diagnostic right mammogram on 5/7/2018 BI-RADS Category 2.  Routine follow-up mammography recommended.    Screening mammogram 5/14/2019:    IMPRESSION:  There are no findings suspicious for malignancy in the right  breast. Routine follow-up mammography is recommended.     BI-RADS Category 1: Negative.      DEXA scan 5/14/2019:  IMPRESSION:  1. T-score is lowest within the right femoral neck where it indicates  osteoporosis.  2. When compared to the prior exam 05/02/2017 BMD of the lumbar spine is  increased 3% and BMD at the left femoral neck is increased 8% and BMD  the right femoral  neck is increased 3%.        ASSESSMENT:  This is a 79 y.o. female with:  1.  History of pathologic stage IIIc invasive mammary carcinoma of the lower outer left breast.  She had a left mastectomy and subsequently completed adjuvant chemotherapy with Adriamycin and Cytoxan followed by Taxol, with chemotherapy initiated on 10/5/2012.  Chemotherapy was complete as of 3/14/2013.  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  Anastrozole 1 mg daily was initiated in April 2013 and therefore 5 years were complete as of April 2018.  We discussed the possibility of discontinuing the medication.  I advised her that the benefits will last for a few more years at least.  She is reluctant to stop.  I previously advised her that her osteoporosis and severe osteoarthritis would be reasons to discontinue the medication.  However, she wants to remain on it at this time.      2.  Osteoporosis: Reclast was initiated in April 2013 with plans for annual therapy.   DEXA scan on 5/2/2017 with no significant change from prior.  Repeat DEXA scan on 5/14/2019 does show some improvement.  She continues calcium and vitamin D.  She has had 6 annual Reclast infusions at this point.  Because we are seeing some improvement and because of the risks of ongoing therapy, I recommended not giving her Reclast today and reevaluating with the DEXA scan 6 months from now    3.  Macrocytosis: Her MCV has been fluctuating.  It is high again today.  She is on a vitamin B vitamin.  Continue to monitor.    4.  Thrombocytopenia: Platelets are a little bit lower today.  No bleeding.  She continues Eliquis.  We will monitor in 6 months.  Myelodysplasia would be a consideration particularly considering the macrocytosis.    PLAN:   1.  Continue anastrozole 1 mg daily for now.    2.  No further request at this time  3.  I will see her back in 6 months for follow-up with a CBC and breast  examination.  DEXA scan done prior to that.

## 2020-01-01 ENCOUNTER — OFFICE VISIT (OUTPATIENT)
Dept: ONCOLOGY | Facility: CLINIC | Age: 80
End: 2020-01-01

## 2020-01-01 ENCOUNTER — TELEPHONE (OUTPATIENT)
Dept: ONCOLOGY | Facility: CLINIC | Age: 80
End: 2020-01-01

## 2020-01-01 ENCOUNTER — HOSPITAL ENCOUNTER (OUTPATIENT)
Dept: MAMMOGRAPHY | Facility: HOSPITAL | Age: 80
Discharge: HOME | End: 2020-07-01
Admitting: INTERNAL MEDICINE

## 2020-01-01 ENCOUNTER — INFUSION (OUTPATIENT)
Dept: ONCOLOGY | Facility: HOSPITAL | Age: 80
End: 2020-01-01

## 2020-01-01 ENCOUNTER — HOSPITAL ENCOUNTER (OUTPATIENT)
Dept: PHYSICAL THERAPY | Facility: CLINIC | Age: 80
Setting detail: RECURRING SERIES
Discharge: HOME | End: 2020-09-29
Attending: ORTHOPAEDIC SURGERY

## 2020-01-01 ENCOUNTER — APPOINTMENT (OUTPATIENT)
Dept: BONE DENSITY | Facility: HOSPITAL | Age: 80
End: 2020-01-01

## 2020-01-01 ENCOUNTER — LAB (OUTPATIENT)
Dept: LAB | Facility: HOSPITAL | Age: 80
End: 2020-01-01

## 2020-01-01 ENCOUNTER — OFFICE VISIT CONVERTED (OUTPATIENT)
Dept: SURGERY | Facility: CLINIC | Age: 80
End: 2020-01-01
Attending: PHYSICIAN ASSISTANT

## 2020-01-01 ENCOUNTER — CONVERSION ENCOUNTER (OUTPATIENT)
Dept: SURGERY | Facility: CLINIC | Age: 80
End: 2020-01-01

## 2020-01-01 ENCOUNTER — HOSPITAL ENCOUNTER (OUTPATIENT)
Dept: PREADMISSION TESTING | Facility: HOSPITAL | Age: 80
Discharge: HOME | End: 2020-08-27
Attending: ORTHOPAEDIC SURGERY

## 2020-01-01 ENCOUNTER — HOSPITAL ENCOUNTER (OUTPATIENT)
Dept: BONE DENSITY | Facility: HOSPITAL | Age: 80
Discharge: HOME | End: 2020-07-01

## 2020-01-01 ENCOUNTER — OFFICE VISIT CONVERTED (OUTPATIENT)
Dept: ORTHOPEDIC SURGERY | Facility: CLINIC | Age: 80
End: 2020-01-01
Attending: ORTHOPAEDIC SURGERY

## 2020-01-01 ENCOUNTER — TELEPHONE (OUTPATIENT)
Dept: ONCOLOGY | Facility: HOSPITAL | Age: 80
End: 2020-01-01

## 2020-01-01 ENCOUNTER — TELEPHONE (OUTPATIENT)
Dept: GENERAL RADIOLOGY | Facility: HOSPITAL | Age: 80
End: 2020-01-01

## 2020-01-01 ENCOUNTER — OFFICE VISIT CONVERTED (OUTPATIENT)
Dept: ORTHOPEDIC SURGERY | Facility: CLINIC | Age: 80
End: 2020-01-01
Attending: PHYSICIAN ASSISTANT

## 2020-01-01 ENCOUNTER — APPOINTMENT (OUTPATIENT)
Dept: ONCOLOGY | Facility: HOSPITAL | Age: 80
End: 2020-01-01

## 2020-01-01 ENCOUNTER — HOSPITAL ENCOUNTER (OUTPATIENT)
Dept: PREADMISSION TESTING | Facility: HOSPITAL | Age: 80
Discharge: HOME | End: 2020-08-10
Attending: ORTHOPAEDIC SURGERY

## 2020-01-01 ENCOUNTER — APPOINTMENT (OUTPATIENT)
Dept: LAB | Facility: HOSPITAL | Age: 80
End: 2020-01-01

## 2020-01-01 ENCOUNTER — HOSPITAL ENCOUNTER (OUTPATIENT)
Dept: SURGERY | Facility: CLINIC | Age: 80
Discharge: HOME | End: 2020-02-26
Attending: PHYSICIAN ASSISTANT

## 2020-01-01 VITALS
TEMPERATURE: 97.9 F | HEIGHT: 62 IN | OXYGEN SATURATION: 82 % | RESPIRATION RATE: 19 BRPM | HEART RATE: 83 BPM | DIASTOLIC BLOOD PRESSURE: 72 MMHG | BODY MASS INDEX: 23.74 KG/M2 | SYSTOLIC BLOOD PRESSURE: 114 MMHG

## 2020-01-01 VITALS
HEIGHT: 62 IN | TEMPERATURE: 97.3 F | BODY MASS INDEX: 23.76 KG/M2 | DIASTOLIC BLOOD PRESSURE: 69 MMHG | HEART RATE: 87 BPM | WEIGHT: 129.1 LBS | SYSTOLIC BLOOD PRESSURE: 109 MMHG | OXYGEN SATURATION: 96 % | RESPIRATION RATE: 16 BRPM

## 2020-01-01 VITALS
WEIGHT: 128.2 LBS | BODY MASS INDEX: 23.74 KG/M2 | DIASTOLIC BLOOD PRESSURE: 67 MMHG | HEART RATE: 74 BPM | TEMPERATURE: 98 F | SYSTOLIC BLOOD PRESSURE: 114 MMHG | RESPIRATION RATE: 16 BRPM | OXYGEN SATURATION: 98 %

## 2020-01-01 VITALS
SYSTOLIC BLOOD PRESSURE: 123 MMHG | TEMPERATURE: 97.8 F | HEART RATE: 74 BPM | OXYGEN SATURATION: 94 % | DIASTOLIC BLOOD PRESSURE: 75 MMHG | RESPIRATION RATE: 18 BRPM | HEIGHT: 62 IN | WEIGHT: 129.2 LBS | BODY MASS INDEX: 23.77 KG/M2

## 2020-01-01 VITALS — SYSTOLIC BLOOD PRESSURE: 110 MMHG | DIASTOLIC BLOOD PRESSURE: 68 MMHG

## 2020-01-01 DIAGNOSIS — C50.512 MALIGNANT NEOPLASM OF LOWER-OUTER QUADRANT OF LEFT BREAST OF FEMALE, ESTROGEN RECEPTOR POSITIVE (HCC): Primary | ICD-10-CM

## 2020-01-01 DIAGNOSIS — Z17.0 MALIGNANT NEOPLASM OF LOWER-OUTER QUADRANT OF LEFT BREAST OF FEMALE, ESTROGEN RECEPTOR POSITIVE (HCC): ICD-10-CM

## 2020-01-01 DIAGNOSIS — E61.1 IRON DEFICIENCY: Primary | ICD-10-CM

## 2020-01-01 DIAGNOSIS — M81.0 OSTEOPOROSIS WITHOUT CURRENT PATHOLOGICAL FRACTURE, UNSPECIFIED OSTEOPOROSIS TYPE: ICD-10-CM

## 2020-01-01 DIAGNOSIS — D64.9 NORMOCYTIC ANEMIA: ICD-10-CM

## 2020-01-01 DIAGNOSIS — Z17.0 MALIGNANT NEOPLASM OF LOWER-OUTER QUADRANT OF LEFT BREAST OF FEMALE, ESTROGEN RECEPTOR POSITIVE (HCC): Primary | ICD-10-CM

## 2020-01-01 DIAGNOSIS — C50.512 MALIGNANT NEOPLASM OF LOWER-OUTER QUADRANT OF LEFT BREAST OF FEMALE, ESTROGEN RECEPTOR POSITIVE (HCC): ICD-10-CM

## 2020-01-01 DIAGNOSIS — Z85.3 HISTORY OF BREAST CANCER: ICD-10-CM

## 2020-01-01 DIAGNOSIS — Z85.3 HISTORY OF BREAST CANCER: Primary | ICD-10-CM

## 2020-01-01 DIAGNOSIS — Z12.31 ENCOUNTER FOR SCREENING MAMMOGRAM FOR MALIGNANT NEOPLASM OF BREAST: ICD-10-CM

## 2020-01-01 DIAGNOSIS — E61.1 IRON DEFICIENCY: ICD-10-CM

## 2020-01-01 LAB
ALBUMIN SERPL-MCNC: 2.8 G/DL (ref 2.9–4.4)
ALBUMIN SERPL-MCNC: 2.9 G/DL (ref 3.5–5)
ALBUMIN SERPL-MCNC: 3.2 G/DL (ref 3.5–5.2)
ALBUMIN/GLOB SERPL: 1 {RATIO} (ref 0.7–1.7)
ALBUMIN/GLOB SERPL: 1 {RATIO} (ref 1.4–2.6)
ALBUMIN/GLOB SERPL: 1.2 G/DL (ref 1.1–2.4)
ALP SERPL-CCNC: 127 U/L (ref 38–116)
ALP SERPL-CCNC: 133 U/L (ref 43–160)
ALPHA1 GLOB FLD ELPH-MCNC: 0.4 G/DL (ref 0–0.4)
ALPHA2 GLOB SERPL ELPH-MCNC: 0.7 G/DL (ref 0.4–1)
ALT SERPL W P-5'-P-CCNC: 12 U/L (ref 0–33)
ALT SERPL-CCNC: 17 U/L (ref 10–40)
ANION GAP SERPL CALC-SCNC: 21 MMOL/L (ref 8–19)
ANION GAP SERPL CALCULATED.3IONS-SCNC: 10.7 MMOL/L (ref 5–15)
APTT BLD: 25.1 S (ref 22.2–34.2)
AST SERPL-CCNC: 28 U/L (ref 0–32)
AST SERPL-CCNC: 30 U/L (ref 15–50)
B-GLOBULIN SERPL ELPH-MCNC: 0.8 G/DL (ref 0.7–1.3)
BACTERIA UR CULT: NORMAL
BASOPHILS # BLD AUTO: 0.04 10*3/UL (ref 0–0.2)
BASOPHILS # BLD AUTO: 0.05 10*3/MM3 (ref 0–0.2)
BASOPHILS NFR BLD AUTO: 0.9 % (ref 0–1.5)
BASOPHILS NFR BLD AUTO: 0.9 % (ref 0–3)
BASOPHILS NFR BLD AUTO: 1.1 % (ref 0–1.5)
BASOPHILS NFR BLD AUTO: 1.2 % (ref 0–1.5)
BILIRUB SERPL-MCNC: 0.8 MG/DL (ref 0.2–1.2)
BILIRUB SERPL-MCNC: 0.91 MG/DL (ref 0.2–1.3)
BUN SERPL-MCNC: 26 MG/DL (ref 5–25)
BUN SERPL-MCNC: 26 MG/DL (ref 6–20)
BUN/CREAT SERPL: 25 {RATIO} (ref 6–20)
BUN/CREAT SERPL: 25.7 (ref 7.3–30)
CALCIUM SERPL-MCNC: 11.1 MG/DL (ref 8.7–10.4)
CALCIUM SPEC-SCNC: 10.1 MG/DL (ref 8.5–10.2)
CHLORIDE SERPL-SCNC: 102 MMOL/L (ref 98–107)
CHLORIDE SERPL-SCNC: 102 MMOL/L (ref 99–111)
CO2 SERPL-SCNC: 26.3 MMOL/L (ref 22–29)
CONV ABS IMM GRAN: 0.01 10*3/UL (ref 0–0.2)
CONV CO2: 23 MMOL/L (ref 22–32)
CONV IMMATURE GRAN: 0.2 % (ref 0–1.8)
CONV TOTAL PROTEIN: 5.7 G/DL (ref 6.3–8.2)
CREAT SERPL-MCNC: 1.01 MG/DL (ref 0.6–1.1)
CREAT UR-MCNC: 1.05 MG/DL (ref 0.5–0.9)
DEPRECATED RDW RBC AUTO: 50 FL (ref 37–54)
DEPRECATED RDW RBC AUTO: 55.9 FL (ref 36.4–46.3)
DEPRECATED RDW RBC AUTO: 55.9 FL (ref 37–54)
EOSINOPHIL # BLD AUTO: 0.03 10*3/MM3 (ref 0–0.4)
EOSINOPHIL # BLD AUTO: 0.07 10*3/MM3 (ref 0–0.4)
EOSINOPHIL # BLD AUTO: 0.09 10*3/MM3 (ref 0–0.4)
EOSINOPHIL # BLD AUTO: 0.1 10*3/UL (ref 0–0.7)
EOSINOPHIL # BLD AUTO: 2.2 % (ref 0–7)
EOSINOPHIL NFR BLD AUTO: 0.7 % (ref 0.3–6.2)
EOSINOPHIL NFR BLD AUTO: 1.5 % (ref 0.3–6.2)
EOSINOPHIL NFR BLD AUTO: 1.6 % (ref 0.3–6.2)
ERYTHROCYTE [DISTWIDTH] IN BLOOD BY AUTOMATED COUNT: 15.4 % (ref 12.3–15.4)
ERYTHROCYTE [DISTWIDTH] IN BLOOD BY AUTOMATED COUNT: 17.9 % (ref 11.7–14.4)
ERYTHROCYTE [DISTWIDTH] IN BLOOD BY AUTOMATED COUNT: 18.1 % (ref 12.3–15.4)
ERYTHROCYTE [DISTWIDTH] IN BLOOD BY AUTOMATED COUNT: ABNORMAL %
ERYTHROCYTE [SEDIMENTATION RATE] IN BLOOD: 9 MM/HR (ref 0–30)
EST. AVERAGE GLUCOSE BLD GHB EST-MCNC: 117 MG/DL
FERRITIN SERPL-MCNC: 1410.3 NG/ML (ref 13–150)
FERRITIN SERPL-MCNC: 78.8 NG/ML (ref 13–150)
FOLATE BLD-MCNC: 423 NG/ML
FOLATE RBC-MCNC: 1175 NG/ML
GAMMA GLOB SERPL ELPH-MCNC: 0.8 G/DL (ref 0.4–1.8)
GFR SERPL CREATININE-BSD FRML MDRD: 53 ML/MIN/1.73
GFR SERPLBLD BASED ON 1.73 SQ M-ARVRAT: 50 ML/MIN/{1.73_M2}
GLOBULIN SER CALC-MCNC: 2.7 G/DL (ref 2.2–3.9)
GLOBULIN UR ELPH-MCNC: 2.7 GM/DL (ref 1.8–3.5)
GLOBULIN UR ELPH-MCNC: 2.8 G/DL (ref 2–3.5)
GLUCOSE SERPL-MCNC: 137 MG/DL (ref 74–124)
GLUCOSE SERPL-MCNC: 145 MG/DL (ref 65–99)
HBA1C MFR BLD: 5.7 % (ref 3.5–5.7)
HCT VFR BLD AUTO: 34 % (ref 34–46.6)
HCT VFR BLD AUTO: 34.1 % (ref 34–46.6)
HCT VFR BLD AUTO: 34.4 % (ref 37–47)
HCT VFR BLD AUTO: 36 % (ref 34–46.6)
HCT VFR BLD AUTO: 38.3 % (ref 34–46.6)
HGB BLD-MCNC: 10.8 G/DL (ref 12–15.9)
HGB BLD-MCNC: 10.8 G/DL (ref 12–16)
HGB BLD-MCNC: 11.2 G/DL (ref 12–15.9)
HGB BLD-MCNC: 12.1 G/DL (ref 12–15.9)
HGB RETIC QN AUTO: 26.9 PG (ref 29.8–36.1)
IGA SERPL-MCNC: 380 MG/DL (ref 64–422)
IGG SERPL-MCNC: 785 MG/DL (ref 586–1602)
IGM SERPL-MCNC: 149 MG/DL (ref 26–217)
IMM GRANULOCYTES # BLD AUTO: 0.01 10*3/MM3 (ref 0–0.05)
IMM GRANULOCYTES # BLD AUTO: 0.02 10*3/MM3 (ref 0–0.05)
IMM GRANULOCYTES # BLD AUTO: 0.02 10*3/MM3 (ref 0–0.05)
IMM GRANULOCYTES NFR BLD AUTO: 0.2 % (ref 0–0.5)
IMM GRANULOCYTES NFR BLD AUTO: 0.4 % (ref 0–0.5)
IMM GRANULOCYTES NFR BLD AUTO: 0.4 % (ref 0–0.5)
IMM RETICS NFR: 21.4 % (ref 3–15.8)
INR PPP: 1.22 (ref 2–3)
IRON 24H UR-MRATE: 24 MCG/DL (ref 37–145)
IRON 24H UR-MRATE: 37 MCG/DL (ref 37–145)
IRON SATN MFR SERPL: 17 % (ref 14–48)
IRON SATN MFR SERPL: 8 % (ref 14–48)
LYMPHOCYTES # BLD AUTO: 0.37 10*3/MM3 (ref 0.7–3.1)
LYMPHOCYTES # BLD AUTO: 0.51 10*3/MM3 (ref 0.7–3.1)
LYMPHOCYTES # BLD AUTO: 0.52 10*3/UL (ref 1–5)
LYMPHOCYTES # BLD AUTO: 0.9 10*3/MM3 (ref 0.7–3.1)
LYMPHOCYTES NFR BLD AUTO: 10.8 % (ref 19.6–45.3)
LYMPHOCYTES NFR BLD AUTO: 11.2 % (ref 20–45)
LYMPHOCYTES NFR BLD AUTO: 16.4 % (ref 19.6–45.3)
LYMPHOCYTES NFR BLD AUTO: 9.2 % (ref 19.6–45.3)
Lab: ABNORMAL
M-SPIKE: ABNORMAL G/DL
MCH RBC QN AUTO: 27 PG (ref 27–31)
MCH RBC QN AUTO: 27.6 PG (ref 26.6–33)
MCH RBC QN AUTO: 29.2 PG (ref 26.6–33)
MCH RBC QN AUTO: 31 PG (ref 26.6–33)
MCHC RBC AUTO-ENTMCNC: 31.4 G/DL (ref 33–37)
MCHC RBC AUTO-ENTMCNC: 31.6 G/DL (ref 31.5–35.7)
MCHC RBC AUTO-ENTMCNC: 31.8 G/DL (ref 31.5–35.7)
MCHC RBC AUTO-ENTMCNC: 32.8 G/DL (ref 31.5–35.7)
MCV RBC AUTO: 86 FL (ref 81–99)
MCV RBC AUTO: 87 FL (ref 79–97)
MCV RBC AUTO: 88.8 FL (ref 79–97)
MCV RBC AUTO: 98.2 FL (ref 79–97)
MONOCYTES # BLD AUTO: 0.43 10*3/MM3 (ref 0.1–0.9)
MONOCYTES # BLD AUTO: 0.48 10*3/MM3 (ref 0.1–0.9)
MONOCYTES # BLD AUTO: 0.48 10*3/UL (ref 0.2–1.2)
MONOCYTES # BLD AUTO: 0.63 10*3/MM3 (ref 0.1–0.9)
MONOCYTES NFR BLD AUTO: 10.1 % (ref 5–12)
MONOCYTES NFR BLD AUTO: 10.3 % (ref 3–10)
MONOCYTES NFR BLD AUTO: 10.7 % (ref 5–12)
MONOCYTES NFR BLD AUTO: 11.5 % (ref 5–12)
NEUTROPHILS # BLD AUTO: 3.5 10*3/UL (ref 2–8)
NEUTROPHILS NFR BLD AUTO: 3.13 10*3/MM3 (ref 1.7–7)
NEUTROPHILS NFR BLD AUTO: 3.61 10*3/MM3 (ref 1.7–7)
NEUTROPHILS NFR BLD AUTO: 3.79 10*3/MM3 (ref 1.7–7)
NEUTROPHILS NFR BLD AUTO: 69.2 % (ref 42.7–76)
NEUTROPHILS NFR BLD AUTO: 75.2 % (ref 30–85)
NEUTROPHILS NFR BLD AUTO: 76.1 % (ref 42.7–76)
NEUTROPHILS NFR BLD AUTO: 78 % (ref 42.7–76)
NRBC BLD AUTO-RTO: 0 /100 WBC (ref 0–0.2)
NRBC CBCN: 0 % (ref 0–0.7)
OSMOLALITY SERPL CALC.SUM OF ELEC: 299 MOSM/KG (ref 273–304)
PLATELET # BLD AUTO: 170 10*3/MM3 (ref 140–450)
PLATELET # BLD AUTO: 267 10*3/MM3 (ref 140–450)
PLATELET # BLD AUTO: 271 10*3/MM3 (ref 140–450)
PLATELET # BLD AUTO: 280 10*3/UL (ref 130–400)
PMV BLD AUTO: 10.1 FL (ref 9.4–12.3)
PMV BLD AUTO: 10.6 FL (ref 6–12)
PMV BLD AUTO: 10.7 FL (ref 6–12)
PMV BLD AUTO: 9.5 FL (ref 6–12)
POTASSIUM SERPL-SCNC: 4.3 MMOL/L (ref 3.5–4.7)
POTASSIUM SERPL-SCNC: 4.8 MMOL/L (ref 3.5–5.3)
PROT PATTERN SERPL IFE-IMP: NORMAL
PROT SERPL-MCNC: 5.5 G/DL (ref 6–8.5)
PROT SERPL-MCNC: 5.9 G/DL (ref 6.3–8)
PROTHROMBIN TIME: 12.7 S (ref 9.4–12)
RBC # BLD AUTO: 3.84 10*6/MM3 (ref 3.77–5.28)
RBC # BLD AUTO: 3.9 10*6/MM3 (ref 3.77–5.28)
RBC # BLD AUTO: 3.91 10*6/MM3 (ref 3.77–5.28)
RBC # BLD AUTO: 4 10*6/UL (ref 4.2–5.4)
RETICS/RBC NFR AUTO: 2.01 % (ref 0.7–1.9)
SARS-COV-2 RNA SPEC QL NAA+PROBE: NOT DETECTED
SODIUM SERPL-SCNC: 139 MMOL/L (ref 134–145)
SODIUM SERPL-SCNC: 141 MMOL/L (ref 135–147)
TIBC SERPL-MCNC: 217 MCG/DL (ref 249–505)
TIBC SERPL-MCNC: 283 MCG/DL (ref 249–505)
TRANSFERRIN SERPL-MCNC: 155 MG/DL (ref 200–360)
TRANSFERRIN SERPL-MCNC: 202 MG/DL (ref 200–360)
VIT B12 BLD-MCNC: 741 PG/ML (ref 211–946)
WBC # BLD AUTO: 4.02 10*3/MM3 (ref 3.4–10.8)
WBC # BLD AUTO: 4.65 10*3/UL (ref 4.8–10.8)
WBC # BLD AUTO: 4.74 10*3/MM3 (ref 3.4–10.8)
WBC # BLD AUTO: 5.48 10*3/MM3 (ref 3.4–10.8)

## 2020-01-01 PROCEDURE — 77080 DXA BONE DENSITY AXIAL: CPT

## 2020-01-01 PROCEDURE — 85652 RBC SED RATE AUTOMATED: CPT | Performed by: INTERNAL MEDICINE

## 2020-01-01 PROCEDURE — 85046 RETICYTE/HGB CONCENTRATE: CPT | Performed by: INTERNAL MEDICINE

## 2020-01-01 PROCEDURE — 77067 SCR MAMMO BI INCL CAD: CPT

## 2020-01-01 PROCEDURE — 85025 COMPLETE CBC W/AUTO DIFF WBC: CPT

## 2020-01-01 PROCEDURE — 96374 THER/PROPH/DIAG INJ IV PUSH: CPT

## 2020-01-01 PROCEDURE — 82728 ASSAY OF FERRITIN: CPT

## 2020-01-01 PROCEDURE — 96365 THER/PROPH/DIAG IV INF INIT: CPT

## 2020-01-01 PROCEDURE — 36415 COLL VENOUS BLD VENIPUNCTURE: CPT

## 2020-01-01 PROCEDURE — 83540 ASSAY OF IRON: CPT

## 2020-01-01 PROCEDURE — 99214 OFFICE O/P EST MOD 30 MIN: CPT | Performed by: INTERNAL MEDICINE

## 2020-01-01 PROCEDURE — 80053 COMPREHEN METABOLIC PANEL: CPT | Performed by: INTERNAL MEDICINE

## 2020-01-01 PROCEDURE — 36415 COLL VENOUS BLD VENIPUNCTURE: CPT | Performed by: INTERNAL MEDICINE

## 2020-01-01 PROCEDURE — 99214 OFFICE O/P EST MOD 30 MIN: CPT | Performed by: NURSE PRACTITIONER

## 2020-01-01 PROCEDURE — 84466 ASSAY OF TRANSFERRIN: CPT

## 2020-01-01 PROCEDURE — 63710000001 PROCHLORPERAZINE MALEATE PER 5 MG: Performed by: INTERNAL MEDICINE

## 2020-01-01 PROCEDURE — 25010000002 FERRIC CARBOXYMALTOSE 750 MG/15ML SOLUTION 15 ML VIAL: Performed by: INTERNAL MEDICINE

## 2020-01-01 PROCEDURE — 82607 VITAMIN B-12: CPT | Performed by: INTERNAL MEDICINE

## 2020-01-01 PROCEDURE — 83540 ASSAY OF IRON: CPT | Performed by: INTERNAL MEDICINE

## 2020-01-01 PROCEDURE — 84466 ASSAY OF TRANSFERRIN: CPT | Performed by: INTERNAL MEDICINE

## 2020-01-01 PROCEDURE — 77063 BREAST TOMOSYNTHESIS BI: CPT

## 2020-01-01 PROCEDURE — 99213 OFFICE O/P EST LOW 20 MIN: CPT | Performed by: NURSE PRACTITIONER

## 2020-01-01 RX ORDER — DOXYCYCLINE HYCLATE 100 MG/1
100 CAPSULE ORAL 2 TIMES DAILY
COMMUNITY

## 2020-01-01 RX ORDER — PROCHLORPERAZINE MALEATE 5 MG/1
10 TABLET ORAL ONCE
Status: COMPLETED | OUTPATIENT
Start: 2020-01-01 | End: 2020-01-01

## 2020-01-01 RX ORDER — ANASTROZOLE 1 MG/1
TABLET ORAL
Qty: 90 TABLET | Refills: 0 | Status: SHIPPED | OUTPATIENT
Start: 2020-01-01 | End: 2020-01-01 | Stop reason: SDUPTHER

## 2020-01-01 RX ORDER — ANASTROZOLE 1 MG/1
1 TABLET ORAL DAILY
Qty: 90 TABLET | Refills: 3 | Status: SHIPPED | OUTPATIENT
Start: 2020-01-01

## 2020-01-01 RX ORDER — NITROFURANTOIN MACROCRYSTALS 50 MG/1
CAPSULE ORAL
COMMUNITY
Start: 2020-01-01

## 2020-01-01 RX ORDER — SODIUM CHLORIDE 9 MG/ML
250 INJECTION, SOLUTION INTRAVENOUS ONCE
Status: COMPLETED | OUTPATIENT
Start: 2020-01-01 | End: 2020-01-01

## 2020-01-01 RX ADMIN — PROCHLORPERAZINE MALEATE 10 MG: 5 TABLET ORAL at 11:51

## 2020-01-01 RX ADMIN — SODIUM CHLORIDE 250 ML: 9 INJECTION, SOLUTION INTRAVENOUS at 11:45

## 2020-01-01 RX ADMIN — PROCHLORPERAZINE MALEATE 10 MG: 5 TABLET ORAL at 14:39

## 2020-01-01 RX ADMIN — SODIUM CHLORIDE 250 ML: 9 INJECTION, SOLUTION INTRAVENOUS at 14:40

## 2020-01-01 RX ADMIN — FERRIC CARBOXYMALTOSE INJECTION 750 MG: 50 INJECTION, SOLUTION INTRAVENOUS at 12:00

## 2020-01-01 RX ADMIN — SODIUM CHLORIDE 750 MG: 900 INJECTION, SOLUTION INTRAVENOUS at 14:41

## 2020-05-13 NOTE — TELEPHONE ENCOUNTER
Called pt about her dexa - pt asked about her mammo when it was scheduled? Pt wanted to move this out about a month and wanted everything scheduled together mammo, dexa and labs so that she could do a telephone visit a wk later. Sent mess to dr johnson

## 2020-05-13 NOTE — TELEPHONE ENCOUNTER
----- Message from Dipesh Monterroso MD sent at 5/13/2020  4:48 PM EDT -----  Regarding: DEXA  Is she getting he DEXA on 5/15?  If so, could she come in for her CBC here that day?  Then I could have a telephone or video visit with her.  AURELIO

## 2020-07-08 PROBLEM — D64.9 NORMOCYTIC ANEMIA: Status: ACTIVE | Noted: 2020-01-01

## 2020-07-08 NOTE — PROGRESS NOTES
[facility] GROUP OUTPATIENT CLINIC FOLLOW UP VISIT    REASON FOR FOLLOW-UP:    1.  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  She had a left mastectomy and subsequently completed adjuvant chemotherapy with Adriamycin and Cytoxan followed by Taxol, with chemotherapy initiated on 10/5/2012.  Chemotherapy was complete as of 3/14/2013.  2.  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  3.  Anastrozole 1 mg daily was initiated in April 2013.  4.  Osteoporosis: Reclast was initiated in April 2013 with plans for annual therapy.  Bone density on 5/2/2017 shows osteoporosis with stable findings.    HISTORY OF PRESENT ILLNESS:  Michelle Peña is a 80 y.o. female who returns today for follow up of the above issue.      She continues to have osteoarthritis pain in her knees.  She does have a left knee replacement scheduled in August.  She is reluctant to have this done as she is concerned about the recovery process particularly with severe osteoarthritis in the right knee as well.  She is lost some weight.  She is not cooking for herself very much.  She notes some dyspnea on exertion and some symptoms that sound consistent with orthopnea.  Occasionally she wakes up feeling short of breath.  No cough.  She continues anastrozole which she tolerates well otherwise.      PAST MEDICAL, SURGICAL, FAMILY, AND SOCIAL HISTORIES WERE REVIEWED WITH THE PATIENT AND IN THE ELECTRONIC MEDICAL RECORD, AND WERE UPDATED IF INDICATED.    ALLERGIES:  Allergies   Allergen Reactions   • Augmentin [Amoxicillin-Pot Clavulanate] Unknown - Low Severity   • Penicillins Unknown - Low Severity       MEDICATIONS:  The medication list has been reviewed with the patient by the medical assistant, and the list has been updated in the electronic medical record, which I reviewed.  Medication dosages and frequencies were confirmed to be  "accurate.    REVIEW OF SYSTEMS:  PAIN:  See Vital Signs below.  GENERAL:  No fevers, chills, night sweats, or unintended weight loss.  fatigue.  SKIN:  No rashes or non-healing lesions.  HEME/LYMPH:  No abnormal bleeding.  No palpable lymphadenopathy.  EYES:  No vision changes or diplopia.  ENT:  No sore throat or difficulty swallowing.  RESPIRATORY:  No cough, shortness of breath, hemoptysis, or wheezing.  CARDIOVASCULAR: Some symptoms consistent with orthopnea  GASTROINTESTINAL:  No abdominal pain, nausea, vomiting, constipation, diarrhea, melena, or hematochezia.  GENITOURINARY:  No dysuria or hematuria.  MUSCULOSKELETAL:  Persistent osteoarthritis pain in her knees and hands  NEUROLOGIC:  No dizziness, loss of consciousness, or seizures.  No neurologic effects following her stroke.  PSYCHIATRIC:  No depression, anxiety, or mood changes.    Vitals:    07/08/20 1042   BP: 109/69   Pulse: 87   Resp: 16   Temp: 97.3 °F (36.3 °C)   TempSrc: Temporal   SpO2: 96%   Weight: 58.6 kg (129 lb 1.6 oz)   Height: 156.5 cm (61.61\")   PainSc: 0-No pain  Comment: breast cancer       PHYSICAL EXAMINATION:  GENERAL:  Well-developed well-nourished female; awake, alert and oriented, in no acute distress.  Wearing a mask.  In a wheelchair today but able to get onto the examination table with some assistance.  SKIN:  Warm and dry, without rashes, purpura, or petechiae.  HEAD:  Normocephalic, atraumatic.  EARS:  Hearing intact.  LYMPHATICS:  No cervical, supraclavicular, axillary, or inguinal lymphadenopathy.  CHEST: Normal respiratory effort.  An ICD is present in the left subclavian area.  BREASTS:  The right breast and left chest wall were examined today.  No nodules or nipple discharge in the right breast.  No skin changes or subcutaneous nodules in the left chest wall.  Examination unchanged from prior again today.  ABDOMEN:  Soft, non-tender, non-distended.  Normal active bowel sounds.  No organomegaly.  EXTREMITIES:  No " clubbing, cyanosis, or edema.  Osteoarthritic changes present in her knees.  Knee brace on the right knee.  NEUROLOGICAL:  No focal neurologic deficits.    DIAGNOSTIC DATA:  Results for orders placed or performed in visit on 07/08/20   CBC Auto Differential   Result Value Ref Range    WBC 5.48 3.40 - 10.80 10*3/mm3    RBC 3.84 3.77 - 5.28 10*6/mm3    Hemoglobin 11.2 (L) 12.0 - 15.9 g/dL    Hematocrit 34.1 34.0 - 46.6 %    MCV 88.8 79.0 - 97.0 fL    MCH 29.2 26.6 - 33.0 pg    MCHC 32.8 31.5 - 35.7 g/dL    RDW 15.4 12.3 - 15.4 %    RDW-SD 50.0 37.0 - 54.0 fl    MPV 10.7 6.0 - 12.0 fL    Platelets 170 140 - 450 10*3/mm3    Neutrophil % 69.2 42.7 - 76.0 %    Lymphocyte % 16.4 (L) 19.6 - 45.3 %    Monocyte % 11.5 5.0 - 12.0 %    Eosinophil % 1.6 0.3 - 6.2 %    Basophil % 0.9 0.0 - 1.5 %    Immature Grans % 0.4 0.0 - 0.5 %    Neutrophils, Absolute 3.79 1.70 - 7.00 10*3/mm3    Lymphocytes, Absolute 0.90 0.70 - 3.10 10*3/mm3    Monocytes, Absolute 0.63 0.10 - 0.90 10*3/mm3    Eosinophils, Absolute 0.09 0.00 - 0.40 10*3/mm3    Basophils, Absolute 0.05 0.00 - 0.20 10*3/mm3    Immature Grans, Absolute 0.02 0.00 - 0.05 10*3/mm3    nRBC 0.0 0.0 - 0.2 /100 WBC       IMAGING:    DEXA 7/1/2020 8% decrease at the left femoral neck (T score -2.5) and no change in the right femoral neck (T score -2.4);.  L spine T score 0.9.  Osteoporosis.      Right mammogram 7/1/2020 BI-RADS category 1.      ASSESSMENT:  This is a 80 y.o. female with:  1.  History of pathologic stage IIIc invasive mammary carcinoma of the lower outer left breast.  She had a left mastectomy and subsequently completed adjuvant chemotherapy with Adriamycin and Cytoxan followed by Taxol, with chemotherapy initiated on 10/5/2012.  Chemotherapy was complete as of 3/14/2013.  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  Anastrozole 1 mg daily was initiated in April 2013 and therefore 5 years were  complete as of April 2018.  We discussed the possibility of discontinuing the medication.  I advised her that the benefits will last for a few more years at least.  Particularly now that she is preparing for a left total knee arthroplasty I think it would benefit her to discontinue the medication to see if    2.  Osteoporosis: Reclast was initiated in April 2013 with plans for annual therapy.   DEXA scan on 5/2/2017 with no significant change from prior.  Repeat DEXA scan on 5/14/2019 did show some improvement.  She continues calcium and vitamin D.  She has had 6 annual Reclast infusions.  Because we saw improvement we elected not to proceed with further Reclast.  DEXA now on 7/1/2020 now with an 8% decrease at the left femoral neck (T score -2.5) and no change in the right femoral neck (T score -2.4);.  L spine T score 0.9.     Plan Reclast at her follow-up appointment.    3.  Macrocytosis: This has resolved.  See below.    4.  Thrombocytopenia: Platelets are normal today.  No bleeding.  She continues Eliquis.     5.  Normocytic anemia: Her hemoglobin today has dropped to 11.2.  The MCV is normal.  We will send her back to the lab for anemia evaluation.  She denies any bleeding.    6.  Daughter states she's a little more confused lately.  I advised discussing this with primary care.      PLAN:   1.  I advised her to hold the anastrozole today as she is preparing for a left total knee arthroplasty next month.  Her arthritis might get better off of the drug and she might be able to recover better from the arthroplasty off of the anastrozole.  If she feels able to go back on the anastrozole after the surgery then she can certainly do this.  2.  Return to the lab for an anemia evaluation today.  Reticulocyte count, ferritin, iron profile, vitamin B12 level, RBC folate level, serum protein electrophoresis with immunofixation, sedimentation rate.  3.  No request for now but at follow-up we will consider another dose of  Reclast when I see her back in the office.  4.  I will see her back in about 4 months for follow-up with labs and Reclast depending on how she is doing.    Discussed with her daughter on the phone.

## 2020-07-31 NOTE — TELEPHONE ENCOUNTER
Pt's son calling wanting Dr. Monterroso to decrease her dosage of Effexor.  States patient is having hallucinations at night.  Reports she is on a new Nebulizer at home and that her Effexor was doubled by her PCP in the past 4-6 months.  Discussed with Dr. Monterroso.  He feels this would best be handled by pt's PCP.  He recommends seeing PCP face-to face or perhaps an ER evaluation.  Pt had a fall on Tuesday, as reported by son and he reported that head scan was OK.  Explained to son that this was not Dr. Monterroso's area of expertise.  Son v/u.  He will follow up with PCP.   The patient is asked to make an attempt to improve diet and exercise patterns to aid in medical management of this problem.  Diet: Diabetes  Food is an important tool that you can use to control diabetes and stay healthy. Eating well-balanced meals in the correct amounts will help you control your blood glucose levels and prevent low blood sugar reactions. It will also help you reduce the health risks of diabetes. There is no one specific diet that is right for everyone with diabetes. But there are general guidelines to follow. A registered dietitian (SHELLI) will create a tailored diet approach thats just right for you. He or she will also help you plan healthy meals and snacks. If you have any questions, call your dietitian for advice.     Guidelines for success  Talk with your healthcare provider before starting a diabetes diet or weight loss program. If you haven't talked with a dietitian yet, ask your provider for a referral. The following guidelines can help you succeed:  · Select foods from the 6 food groups below. Your dietitian will help you find food choices within each group. He or she will also show you serving sizes and how many servings you can have at each meal.  ¨ Grains, beans, and starchy vegetables  ¨ Vegetables  ¨ Fruit  ¨ Milk or yogurt  ¨ Meat, poultry, fish, or tofu  ¨ Healthy fats  · Check your blood sugar levels as directed by your provider. Take any medicine as prescribed by your provider.  · Learn to read food labels and pick the right portion sizes.  · Eat only the amount of food in your meal plan. Eat about the same amount of food at regular times each day. Dont skip meals. Eat meals 4 to 5 hours apart, with snacks in between.  · Limit alcohol. It raises blood sugar levels. Drink water or calorie-free diet drinks that use safe sweeteners.  · Eat less fat to help lower your risk of heart disease. Use nonfat or low-fat dairy products and lean meats. Avoid fried foods. Use cooking oils that  are unsaturated, such as olive, canola, or peanut oil.  · Talk with your dietitian about safe sugar substitutes.  · Avoid added salt. It can contribute to high blood pressure, which can cause heart disease. People with diabetes already have a risk of high blood pressure and heart disease.  · Stay at a healthy weight. If you need to lose weight, cut down on your portion sizes. But dont skip meals. Exercise is an important part of any weight management program. Talk with your provider about an exercise program thats right for you.  · For more information about the best diet plan for you, talk with a registered dietitian (RD). To find an RD in your area, contact:  ¨ Academy of Nutrition and Dietetics www.eatright.org  ¨ The American Diabetes Association 632-361-6416 www.diabetes.org  Date Last Reviewed: 8/1/2016  © 9570-2717 [employer]. [address] [place] [address]. All rights reserved. This information is not intended as a substitute for professional medical care. Always follow your healthcare professional's instructions.        Diabetes: Meal Planning    You can help keep your blood sugar level in your target range by eating healthy foods. Your healthcare team can help you create a low-fat, nutritious meal plan. Take an active role in your diabetes management by following your meal plan and working with your healthcare team.  Make your meal plan  A meal plan gives guidelines for the types and amounts of food you should eat. The goal is to balance food and insulin (or other diabetes medications) so your blood sugars will be in your target range. Your dietitian will help you make a flexible meal plan that includes many foods that you like.  Watch serving sizes  Your meal plan will group foods by servings. To learn how much a serving is, start by measuring food portions at each meal. Soon youll know what a serving looks like on your plate. Ask your healthcare provider about how to balance  servings of different foods.  Eat from all the food groups  The basis of a healthy meal plan is variety (eating lots of different foods). Choose lean meats, fresh fruits and vegetables, whole grains, and low-fat or nonfat dairy products. Eating a wide variety of foods provides the nutrients your body needs. It can also keep you from getting bored with your meal plan.  Learn about carbohydrates, fats, and protein  · Carbohydrates are starches, sugars, and fiber. They are found in many foods, including fruit, bread, pasta, milk, and sweets. Of all the foods you eat, carbohydrates have the most effect on your blood sugar. Your dietitian may teach you about carb counting, a way to figure out the number of carbohydrates in a meal.  · Fats have the most calories. They also have the most effect on your weight and your risk of heart disease. When you have diabetes, its important to control your weight and protect your heart. Foods that are high in fat include whole milk, cheese, snack foods, and desserts.  · Protein is important for building and repairing muscles and bones. Choose low-fat protein sources, such as fish, egg whites, and skinless chicken.  Reduce liquid sugars  Extra calories from sodas, sports drinks, and fruit drinks make it hard to keep blood sugar in range. Cut as many liquid sugars from your meal plan as you can.  This includes most fruit juices, which are often high in natural or added sugar. Instead, drink plenty of water and other sugar-free beverages.  Eat less fat  If you need to lose weight, try to reduce the amount of fat in your diet. This can also help lower your cholesterol level to keep blood vessels healthier. Cut fat by using only small amounts of liquid oil for cooking. Read food labels carefully to avoid foods with unhealthy trans fats.  Timing your meals  When it comes to blood sugar control, when you eat is as important as what you eat. You may need to eat several small meals spaced  evenly throughout the day to stay in your target range. So dont skip breakfast or wait until late in the day to get most of your calories. Doing so can cause your blood sugar to rise too high or fall too low.   Date Last Reviewed: 3/1/2016  © 7449-4755 [employer]. [address] [place] [address], [place] [address]. All rights reserved. This information is not intended as a substitute for professional medical care. Always follow your healthcare professional's instructions.

## 2020-08-06 NOTE — TELEPHONE ENCOUNTER
Daughter called. Stated PCP porsche labs and she needs an iron infusion. Wants to know if we can do that her. She sees Dr Monterroso. Instructed her to have PCP fax us her labs. In basket message to Dr Monterroso. Daughter verbalized understanding.

## 2020-08-06 NOTE — TELEPHONE ENCOUNTER
----- Message from Dipesh Monterroso MD sent at 8/6/2020  3:44 PM EDT -----  She was supposed to return to the lab at her last visit when I saw her, but it doesn't look like she did.  Yes, we need labs faxed to us and she needs to come in to see an NP with the labs that we ordered.  Go ahead and schedule Injectafer that day as well just in case.  Thanks, [facility]     ----- Message -----  From: Marcia Beyer RN  Sent: 8/6/2020   2:35 PM EDT  To: Dipesh Monterroso MD    Her daughter called and said her PCP stated she needs an iron infusion. Will you do this and they are having labs faxed to us.    Daughter verbalized understanding. In basket message to scheduling.

## 2020-08-09 PROBLEM — E61.1 IRON DEFICIENCY: Status: ACTIVE | Noted: 2020-01-01

## 2020-08-11 NOTE — PROGRESS NOTES
[facility] GROUP OUTPATIENT CLINIC FOLLOW UP VISIT    REASON FOR FOLLOW-UP:    1.  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  She had a left mastectomy and subsequently completed adjuvant chemotherapy with Adriamycin and Cytoxan followed by Taxol, with chemotherapy initiated on 10/5/2012.  Chemotherapy was complete as of 3/14/2013.  2.  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  3.  Anastrozole 1 mg daily was initiated in April 2013.  4.  Osteoporosis: Reclast was initiated in April 2013 with plans for annual therapy.  Bone density on 5/2/2017 shows osteoporosis with stable findings.  5.  8/11/2021 of 2 planned Injectafer treatments    HISTORY OF PRESENT ILLNESS:  Michelle Peña is a 80 y.o. female with the above medical history here today for scheduled Injectafer.  Her hemoglobin is 10.8, iron saturation 8 and ferritin 78.  She reports feeling quite weak and tired.  She denies chest pain or heart palpitations.  She does report windedness on exertion.  She denies bleeding and bruising.  Her appetite is good.  Her vital signs are stable.        ALLERGIES:  Allergies   Allergen Reactions   • Augmentin [Amoxicillin-Pot Clavulanate] Unknown - Low Severity   • Penicillins Unknown - Low Severity       MEDICATIONS:  The medication list has been reviewed with the patient by the medical assistant, and the list has been updated in the electronic medical record, which I reviewed.  Medication dosages and frequencies were confirmed to be accurate.    REVIEW OF SYSTEMS:  PAIN:  See Vital Signs below.  GENERAL: See HPI  SKIN:  No rashes or non-healing lesions.  HEME/LYMPH:  No abnormal bleeding.  No palpable lymphadenopathy.  EYES:  No vision changes or diplopia.  ENT:  No sore throat or difficulty swallowing.  RESPIRATORY: Shortness of breath on exertion  CARDIOVASCULAR: Some symptoms consistent with  "orthopnea  GASTROINTESTINAL:  No abdominal pain, nausea, vomiting, constipation, diarrhea, melena, or hematochezia.  GENITOURINARY:  No dysuria or hematuria.  MUSCULOSKELETAL:  Persistent osteoarthritis pain in her knees and hands  NEUROLOGIC:  No dizziness, loss of consciousness, or seizures.  No neurologic effects following her stroke.  PSYCHIATRIC:  No depression, anxiety, or mood changes.    Vitals:    08/11/20 1331   BP: 123/75   Pulse: 74   Resp: 18   Temp: 97.8 °F (36.6 °C)   TempSrc: Temporal   SpO2: 94%   Weight: 58.6 kg (129 lb 3.2 oz)   Height: 156.5 cm (61.61\")   PainSc: 0-No pain       PHYSICAL EXAMINATION:  GENERAL:  Well-developed well-nourished female, she is accompanied by her daughter, she is wearing a facemask.  SKIN:  Warm and dry, without rashes, purpura, or petechiae.  HEAD:  Normocephalic, atraumatic.  EARS:  Hearing intact.  LYMPHATICS:  No cervical, supraclavicular, axillary, or inguinal lymphadenopathy.  CHEST: Normal respiratory effort.  An ICD is present in the left subclavian area.  Cardiac: Regular rate and rhythm  ABDOMEN:  Soft, non-tender, non-distended.   EXTREMITIES:  No clubbing, cyanosis, or edema.  Osteoarthritic changes present in her knees.  Knee brace on the right knee.  NEUROLOGICAL:  No focal neurologic deficits.    DIAGNOSTIC DATA:  Results for orders placed or performed in visit on 08/11/20   Ferritin   Result Value Ref Range    Ferritin 78.80 13.00 - 150.00 ng/mL   CBC Auto Differential   Result Value Ref Range    WBC 4.74 3.40 - 10.80 10*3/mm3    RBC 3.91 3.77 - 5.28 10*6/mm3    Hemoglobin 10.8 (L) 12.0 - 15.9 g/dL    Hematocrit 34.0 34.0 - 46.6 %    MCV 87.0 79.0 - 97.0 fL    MCH 27.6 26.6 - 33.0 pg    MCHC 31.8 31.5 - 35.7 g/dL    RDW 18.1 (H) 12.3 - 15.4 %    RDW-SD 55.9 (H) 37.0 - 54.0 fl    MPV 9.5 6.0 - 12.0 fL    Platelets 271 140 - 450 10*3/mm3    Neutrophil % 76.1 (H) 42.7 - 76.0 %    Lymphocyte % 10.8 (L) 19.6 - 45.3 %    Monocyte % 10.1 5.0 - 12.0 %    " Eosinophil % 1.5 0.3 - 6.2 %    Basophil % 1.1 0.0 - 1.5 %    Immature Grans % 0.4 0.0 - 0.5 %    Neutrophils, Absolute 3.61 1.70 - 7.00 10*3/mm3    Lymphocytes, Absolute 0.51 (L) 0.70 - 3.10 10*3/mm3    Monocytes, Absolute 0.48 0.10 - 0.90 10*3/mm3    Eosinophils, Absolute 0.07 0.00 - 0.40 10*3/mm3    Basophils, Absolute 0.05 0.00 - 0.20 10*3/mm3    Immature Grans, Absolute 0.02 0.00 - 0.05 10*3/mm3    nRBC 0.0 0.0 - 0.2 /100 WBC       IMAGING:    DEXA 7/1/2020 8% decrease at the left femoral neck (T score -2.5) and no change in the right femoral neck (T score -2.4);.  L spine T score 0.9.  Osteoporosis.      Right mammogram 7/1/2020 BI-RADS category 1.      ASSESSMENT:  This is a 80 y.o. female with:  1.  History of pathologic stage IIIc invasive mammary carcinoma of the lower outer left breast.  She had a left mastectomy and subsequently completed adjuvant chemotherapy with Adriamycin and Cytoxan followed by Taxol, with chemotherapy initiated on 10/5/2012.  Chemotherapy was complete as of 3/14/2013.  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  Anastrozole 1 mg daily was initiated in April 2013 and therefore 5 years were complete as of April 2018.  She was instructed to continue anastrozole she may continue to derive benefit on the five-year sima..    2.  Osteoporosis: Reclast was initiated in April 2013 with plans for annual therapy.   DEXA scan on 5/2/2017 with no significant change from prior.  Repeat DEXA scan on 5/14/2019 did show some improvement.  She continues calcium and vitamin D.  She has had 6 annual Reclast infusions.  Because we saw improvement we elected not to proceed with further Reclast.  DEXA now on 7/1/2020 now with an 8% decrease at the left femoral neck (T score -2.5) and no change in the right femoral neck (T score -2.4);.  L spine T score 0.9.     Plan Reclast at her follow-up appointment with Dr. Monterroso in  October.    3.  Macrocytosis: This has resolved.  See below.    4.  Thrombocytopenia: Platelets are normal today.  No bleeding.  She continues Eliquis.     5.  Normocytic anemia: Her hemoglobin today is 10.8.  Iron saturation 8% and ferritin 78.         PLAN:   1.  Continue anastrozole as prescribed.  2.  Proceed with Injectafer, first of 2 planned.  We discussed potential side effects in the office today.  He also reviewed labs in detail and why she requires the supplementation.  3.  No request for now but at follow-up we will consider another dose of Reclast when Dr. Monterroso see her back in the office.  4.  She will return in 1 week for her second Injectafer, then in 1 month to see nurse practitioner at her daughter's request.  She wants to make sure that her labs are recovering appropriately.  I have asked the patient to call the office with any new or worsening symptoms before the scheduled visits.    Mrs. Peña has been intolerant of oral iron in the past due to GI side effects.    Discussed with her daughter on the phone.

## 2020-09-04 NOTE — TELEPHONE ENCOUNTER
Ivette, patient's daughter, calling.    Pt needs to r/s 9/9 appointment.   She needs an appointment a Thursday.    [phone]

## 2020-09-10 NOTE — PROGRESS NOTES
[facility] GROUP OUTPATIENT CLINIC FOLLOW UP VISIT    REASON FOR FOLLOW-UP:    1.  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  She had a left mastectomy and subsequently completed adjuvant chemotherapy with Adriamycin and Cytoxan followed by Taxol, with chemotherapy initiated on 10/5/2012.  Chemotherapy was complete as of 3/14/2013.  2.  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  3.  Anastrozole 1 mg daily was initiated in April 2013.  4.  Osteoporosis: Reclast was initiated in April 2013 with plans for annual therapy.  Bone density on 5/2/2017 shows osteoporosis with stable findings.  5.  8/11/2021 of 2 planned Injectafer treatments      HISTORY OF PRESENT ILLNESS:  Michelle Peña is a 80 y.o. female with the above medical history here today for scheduled lab review at the request of her daughter.  She did receive Injectafer in August and this is reflected by an increased hemoglobin 12.1 versus 10.8.  She denies new symptoms such as bleeding, bruising, dark tarry stool.  She continues to struggle with shortness of breath that is a secondary to known COPD and heart failure.  She did meet with a cardiologist yesterday and her daughter explains what sounds like a diagnosis of congestive heart failure.    Her oxygen sats upon arriving in the office were in the low 80s but increased quickly after sitting down.    Her appetite is fair.      ALLERGIES:  Allergies   Allergen Reactions   • Augmentin [Amoxicillin-Pot Clavulanate] Unknown - Low Severity   • Penicillins Unknown - Low Severity       MEDICATIONS:  The medication list has been reviewed with the patient by the medical assistant, and the list has been updated in the electronic medical record, which I reviewed.  Medication dosages and frequencies were confirmed to be accurate.    REVIEW OF SYSTEMS:  PAIN:  See Vital Signs below.  GENERAL: See  "HPI  SKIN:  No rashes or non-healing lesions.  HEME/LYMPH:  No abnormal bleeding.  No palpable lymphadenopathy.  EYES:  No vision changes or diplopia.  ENT:  No sore throat or difficulty swallowing.  RESPIRATORY: See HPI  CARDIOVASCULAR: See HPI  GASTROINTESTINAL:  No abdominal pain, nausea, vomiting, constipation, diarrhea, melena, or hematochezia.  GENITOURINARY:  No dysuria or hematuria.  MUSCULOSKELETAL:  Persistent osteoarthritis pain in her knees and hands  NEUROLOGIC:  No dizziness, loss of consciousness, or seizures.  No neurologic effects following her stroke.  PSYCHIATRIC:  No depression, anxiety, or mood changes.        Vitals:    09/10/20 1431   BP: 114/72   Pulse: 83   Resp: 19   Temp: 97.9 °F (36.6 °C)   TempSrc: Temporal   SpO2: (!) 82%   Weight: Comment: unable to weight/ Surgery Left knee replacement   Height: 156.5 cm (61.61\")   PainSc:   2   PainLoc: Knee       PHYSICAL EXAMINATION:  GENERAL:  Well-developed well-nourished female, she is accompanied by her daughter, she is wearing a facemask.  SKIN:  Warm and dry, without rashes, purpura, or petechiae.  HEAD:  Normocephalic, atraumatic.  EARS:  Hearing intact.  LYMPHATICS:  No cervical, supraclavicular, axillary, or inguinal lymphadenopathy.  CHEST: Normal respiratory effort.  An ICD is present in the left subclavian area.  Cardiac: Regular rate and rhythm  ABDOMEN:  Soft, non-tender, non-distended.   EXTREMITIES: He does have a knee dressing on the left knee.  No signs of infection or drainage.  NEUROLOGICAL:  No focal neurologic deficits.    DIAGNOSTIC DATA:  Results for orders placed or performed in visit on 09/10/20   Iron Profile   Result Value Ref Range    Iron 37 37 - 145 mcg/dL    Iron Saturation 17 14 - 48 %    Transferrin 155 (L) 200 - 360 mg/dL    TIBC 217 (L) 249 - 505 mcg/dL   CBC Auto Differential   Result Value Ref Range    WBC 4.02 3.40 - 10.80 10*3/mm3    RBC 3.90 3.77 - 5.28 10*6/mm3    Hemoglobin 12.1 12.0 - 15.9 g/dL    " Hematocrit 38.3 34.0 - 46.6 %    MCV 98.2 (H) 79.0 - 97.0 fL    MCH 31.0 26.6 - 33.0 pg    MCHC 31.6 31.5 - 35.7 g/dL    RDW      MPV 10.6 6.0 - 12.0 fL    Platelets 267 140 - 450 10*3/mm3    Neutrophil % 78.0 (H) 42.7 - 76.0 %    Lymphocyte % 9.2 (L) 19.6 - 45.3 %    Monocyte % 10.7 5.0 - 12.0 %    Eosinophil % 0.7 0.3 - 6.2 %    Basophil % 1.2 0.0 - 1.5 %    Immature Grans % 0.2 0.0 - 0.5 %    Neutrophils, Absolute 3.13 1.70 - 7.00 10*3/mm3    Lymphocytes, Absolute 0.37 (L) 0.70 - 3.10 10*3/mm3    Monocytes, Absolute 0.43 0.10 - 0.90 10*3/mm3    Eosinophils, Absolute 0.03 0.00 - 0.40 10*3/mm3    Basophils, Absolute 0.05 0.00 - 0.20 10*3/mm3    Immature Grans, Absolute 0.01 0.00 - 0.05 10*3/mm3    nRBC 0.0 0.0 - 0.2 /100 WBC       IMAGING:    DEXA 7/1/2020 8% decrease at the left femoral neck (T score -2.5) and no change in the right femoral neck (T score -2.4);.  L spine T score 0.9.  Osteoporosis.      Right mammogram 7/1/2020 BI-RADS category 1.      ASSESSMENT:  This is a 80 y.o. female with:  1.  History of pathologic stage IIIc invasive mammary carcinoma of the lower outer left breast.  She had a left mastectomy and subsequently completed adjuvant chemotherapy with Adriamycin and Cytoxan followed by Taxol, with chemotherapy initiated on 10/5/2012.  Chemotherapy was complete as of 3/14/2013.  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  Anastrozole 1 mg daily was initiated in April 2013 and therefore 5 years were complete as of April 2018.  She was instructed to continue anastrozole she may continue to derive benefit on the five-year sima..    2.  Osteoporosis: Reclast was initiated in April 2013 with plans for annual therapy.   DEXA scan on 5/2/2017 with no significant change from prior.  Repeat DEXA scan on 5/14/2019 did show some improvement.  She continues calcium and vitamin D.  She has had 6 annual Reclast infusions.  Because we saw  improvement we elected not to proceed with further Reclast.  DEXA now on 7/1/2020 now with an 8% decrease at the left femoral neck (T score -2.5) and no change in the right femoral neck (T score -2.4);.  L spine T score 0.9.     Plan Reclast at her follow-up appointment with Dr. Monterroso in October.    3.  Macrocytosis: This has resolved.  See below.    4.  Thrombocytopenia: Platelets are normal today.  No bleeding.  She continues Eliquis.     5.  Iron deficiency anemia.  Her hemoglobin today is 12.1.  She did receive Injectafer on 8/11/2020 and 8/18/2020.         PLAN:   1.  Continue anastrozole as prescribed.  2.  I have reviewed labs with the patient and her daughter in detail today.  I have instructed the patient to follow-up with cardiology and pulmonary as necessary.  We did discuss signs and symptoms for which she needs to seek immediate medical attention such as increased progressive shortness of breath, chest pain.  She and her daughter verbalized understanding.  3.  She will return to consider another dose of Reclast and see Dr. Monterroso as already scheduled on November 2, 2020.    Mrs. Peña has been intolerant of oral iron in the past due to GI side effects.

## 2020-10-14 ENCOUNTER — TELEPHONE (OUTPATIENT)
Dept: ONCOLOGY | Facility: CLINIC | Age: 80
End: 2020-10-14

## 2020-10-14 NOTE — TELEPHONE ENCOUNTER
Caller: VI RAMIREZ    Relationship to patient: DAUGHTER    Best call back number: [phone]    CALLED TO INFORM THE PRACTICE THAT THE PT PASSED AWAY ON 9/26

## 2021-05-14 VITALS — HEIGHT: 62 IN | WEIGHT: 138 LBS | HEART RATE: 52 BPM | BODY MASS INDEX: 25.4 KG/M2 | OXYGEN SATURATION: 98 %

## 2021-05-15 VITALS — BODY MASS INDEX: 27.28 KG/M2 | HEIGHT: 62 IN | WEIGHT: 148.25 LBS | OXYGEN SATURATION: 93 % | HEART RATE: 80 BPM

## 2021-05-15 VITALS — OXYGEN SATURATION: 97 % | HEIGHT: 62 IN | WEIGHT: 138 LBS | BODY MASS INDEX: 25.4 KG/M2 | HEART RATE: 115 BPM

## 2021-05-15 VITALS — HEIGHT: 62 IN | BODY MASS INDEX: 26.54 KG/M2 | HEART RATE: 60 BPM | WEIGHT: 144.25 LBS | OXYGEN SATURATION: 96 %

## 2021-05-15 VITALS — HEIGHT: 62 IN | OXYGEN SATURATION: 98 % | WEIGHT: 147.31 LBS | BODY MASS INDEX: 27.11 KG/M2 | HEART RATE: 63 BPM

## 2021-05-15 VITALS — RESPIRATION RATE: 15 BRPM | WEIGHT: 146 LBS | HEIGHT: 62 IN | BODY MASS INDEX: 26.87 KG/M2

## 2021-05-15 VITALS — BODY MASS INDEX: 26.54 KG/M2 | WEIGHT: 144.25 LBS | RESPIRATION RATE: 16 BRPM | HEIGHT: 62 IN

## 2021-05-15 VITALS — RESPIRATION RATE: 14 BRPM | BODY MASS INDEX: 25.4 KG/M2 | HEIGHT: 62 IN | WEIGHT: 138 LBS

## 2021-05-15 VITALS — HEIGHT: 62 IN | BODY MASS INDEX: 25.4 KG/M2 | WEIGHT: 138 LBS

## 2021-05-15 VITALS — HEIGHT: 62 IN | WEIGHT: 150 LBS | BODY MASS INDEX: 27.6 KG/M2 | HEART RATE: 68 BPM | OXYGEN SATURATION: 99 %

## 2021-05-16 VITALS — BODY MASS INDEX: 29.08 KG/M2 | WEIGHT: 158 LBS | RESPIRATION RATE: 16 BRPM | HEIGHT: 62 IN

## 2021-05-16 VITALS — RESPIRATION RATE: 16 BRPM | BODY MASS INDEX: 28.89 KG/M2 | WEIGHT: 157 LBS | HEIGHT: 62 IN
